# Patient Record
Sex: FEMALE | Race: WHITE | NOT HISPANIC OR LATINO | Employment: OTHER | URBAN - METROPOLITAN AREA
[De-identification: names, ages, dates, MRNs, and addresses within clinical notes are randomized per-mention and may not be internally consistent; named-entity substitution may affect disease eponyms.]

---

## 2020-07-08 ENCOUNTER — APPOINTMENT (EMERGENCY)
Dept: CT IMAGING | Facility: HOSPITAL | Age: 70
End: 2020-07-08
Payer: MEDICARE

## 2020-07-08 ENCOUNTER — HOSPITAL ENCOUNTER (EMERGENCY)
Facility: HOSPITAL | Age: 70
Discharge: HOME | End: 2020-07-08
Attending: EMERGENCY MEDICINE
Payer: MEDICARE

## 2020-07-08 VITALS
HEART RATE: 85 BPM | BODY MASS INDEX: 33.38 KG/M2 | TEMPERATURE: 97.9 F | OXYGEN SATURATION: 97 % | HEIGHT: 60 IN | WEIGHT: 170 LBS | SYSTOLIC BLOOD PRESSURE: 137 MMHG | DIASTOLIC BLOOD PRESSURE: 64 MMHG | RESPIRATION RATE: 16 BRPM
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
## 2020-07-08 PROCEDURE — 99284 EMERGENCY DEPT VISIT MOD MDM: CPT

## 2020-07-08 PROCEDURE — 99282 EMERGENCY DEPT VISIT SF MDM: CPT | Performed by: PHYSICIAN ASSISTANT

## 2020-07-08 PROCEDURE — 70450 CT HEAD/BRAIN W/O DYE: CPT

## 2020-07-08 RX ORDER — GINSENG 100 MG
1 CAPSULE ORAL ONCE
Status: COMPLETED | OUTPATIENT
Start: 2020-07-08 | End: 2020-07-08

## 2020-07-08 RX ADMIN — BACITRACIN 1 SMALL APPLICATION: 500 OINTMENT TOPICAL at 12:38

## 2020-07-08 NOTE — DISCHARGE INSTRUCTIONS
Follow-up with family doctor as needed  Return to the emergency department if her condition significantly worsens  Keep the abrasion on her nose clean and dry  Wash it with warm soap and water daily  Apply Neosporin for the next day

## 2020-07-09 NOTE — ED PROVIDER NOTES
History  Chief Complaint   Patient presents with    Nose Problem     fell from standing position, tripped, struck nose face while wearing glasses, laceration to bridge of nose  (-) LOC, ACT, PMH       82-year-old female presents after a fall complaining of a nose abrasion  She states that she was walking on the sidewalk and tripped over something falling and hitting her head  She states that she when faced down and her glasses dug into the bridge of her nose  She denies loss of consciousness  She was not dizzy, lightheaded, nauseous or having any chest pains or palpitations prior to the fall  She describes a mechanical fall in nature  She denies headache or visual changes at this time  The patient denies any fevers, chills, headache, dizziness, sore throat, shortness of breath, chest pain, abdominal pain, dysuria, polyuria, hematuria, melena, hematochezia, lower leg pain or swelling  None       History reviewed  No pertinent past medical history  Past Surgical History:   Procedure Laterality Date    HYSTERECTOMY         History reviewed  No pertinent family history  I have reviewed and agree with the history as documented  E-Cigarette/Vaping     E-Cigarette/Vaping Substances     Social History     Tobacco Use    Smoking status: Never Smoker   Substance Use Topics    Alcohol use: Never     Frequency: Never    Drug use: Never       Review of Systems   Constitutional: Negative for chills, fatigue and fever  HENT: Negative for ear pain and sore throat  Eyes: Negative for pain  Respiratory: Negative for cough, shortness of breath and wheezing  Cardiovascular: Negative for chest pain, palpitations and leg swelling  Gastrointestinal: Negative for abdominal pain, constipation, diarrhea, nausea and vomiting  Endocrine: Negative for polyuria  Genitourinary: Negative for dysuria and pelvic pain  Musculoskeletal: Negative for arthralgias, myalgias, neck pain and neck stiffness  Skin: Positive for wound  Negative for rash  Neurological: Negative for dizziness, syncope, light-headedness and headaches  All other systems reviewed and are negative  Physical Exam  Physical Exam   Constitutional: She is oriented to person, place, and time  She appears well-developed and well-nourished  HENT:   Head: Normocephalic and atraumatic  Mouth/Throat: No oropharyngeal exudate  Eyes: EOM are normal    Neck: Normal range of motion  Cardiovascular: Normal rate, regular rhythm and normal heart sounds  Pulmonary/Chest: Effort normal and breath sounds normal    Abdominal: Soft  Bowel sounds are normal  There is no tenderness  Musculoskeletal: Normal range of motion  Neurological: She is alert and oriented to person, place, and time  No dysarthria, nystagmus, dysphagia, diplopia, aphasia, vertigo, ataxia, visions loss, dysmetria  Normal finger to nose, strength and sensation in bilat upper and lower extremities  No pronator drift  Normal heel to shin  EOMI, no visual field defects  CN 2-12 intact  GCS 15  AOx3  Skin: Skin is warm  Capillary refill takes less than 2 seconds  Psychiatric: She has a normal mood and affect  Vital Signs  ED Triage Vitals [07/08/20 1015]   Temperature Pulse Respirations Blood Pressure SpO2   97 9 °F (36 6 °C) 85 16 137/64 97 %      Temp Source Heart Rate Source Patient Position - Orthostatic VS BP Location FiO2 (%)   Temporal Monitor Lying Left arm --      Pain Score       1           Vitals:    07/08/20 1015   BP: 137/64   Pulse: 85   Patient Position - Orthostatic VS: Lying         Visual Acuity      ED Medications  Medications   bacitracin topical ointment 1 small application (1 small application Topical Given 7/8/20 1238)       Diagnostic Studies  Results Reviewed     None                 CT head without contrast   Final Result by Kp Souza MD (07/08 1241)      No acute intracranial abnormality                    Workstation performed: [id number]                    Procedures  Procedures         ED Course                                             MDM  Number of Diagnoses or Management Options  Fall, initial encounter:   Diagnosis management comments: CT head negative for any acute abnormality  There is no laceration to repair  Only abrasion to bridge of nose which was covered bacitracin  Patient re-evaluated and deemed safe for discharge  Disposition  Final diagnoses:   Fall, initial encounter     Time reflects when diagnosis was documented in both MDM as applicable and the Disposition within this note     Time User Action Codes Description Comment    7/8/2020 12:44 PM Daniel Wells Add [C70  Ghulam Locke, initial encounter       ED Disposition     ED Disposition Condition Date/Time Comment    Discharge Stable Wed Jul 8, 2020 12:44 PM Carole Adam discharge to home/self care  Follow-up Information     Follow up With Specialties Details Why Maite Monaco MD Nephrology  If symptoms worsen [facility] [address] [id number] [phone]            There are no discharge medications for this patient  No discharge procedures on file      PDMP Review     None          ED Provider  Electronically Signed by           Adriana Neri PA-C  07/08/20 2312

## 2022-06-22 ENCOUNTER — TELEPHONE (OUTPATIENT)
Dept: NEPHROLOGY | Facility: CLINIC | Age: 72
End: 2022-06-22

## 2022-06-22 NOTE — TELEPHONE ENCOUNTER
[facility] breast [facility] they are aware patient is no longer under our care  Tried calling patient VM not set up  Will try back

## 2022-06-22 NOTE — TELEPHONE ENCOUNTER
She is not my patient  I have not seen her in years   She must have a new PCP  Please call the patient and tell her this and that she needs to get her PCP to order this

## 2022-06-22 NOTE — TELEPHONE ENCOUNTER
Jan from [address] [place] [address] called stating patients mammogram showed a mass in the left breast further diagnostic study needed  Diagnostic code R92 8  Jan would like you to put an order in for an ultrasound for patient  Jan stated the ultrasound script  must state the following  Left breast diagnostic ultrasound mammogram in indicated    We must fax the script to [phone]

## 2022-07-26 ENCOUNTER — TELEPHONE (OUTPATIENT)
Dept: ADMINISTRATIVE | Facility: OTHER | Age: 72
End: 2022-07-26

## 2022-07-26 NOTE — TELEPHONE ENCOUNTER
----- Message from Jaclyn Christensen sent at 7/25/2022  3:37 PM EDT -----  Regarding: Mammogram  07/25/22 3:37 PM    Hello, our patient Kira Woods has had Mammogram completed/performed  Please assist in updating the patient chart by pulling the Care Everywhere (CE) document  The date of service is 06/17/22       Thank you,  Jaclyn [facility]

## 2022-07-27 NOTE — TELEPHONE ENCOUNTER
Upon review of the In Basket request we were able to locate, review, and update the patient chart as requested for Mammogram     Any additional questions or concerns should be emailed to the Practice Liaisons via [email or web address] email, please do not reply via In Basket      Thank you  Suki Guzman

## 2022-07-29 ENCOUNTER — OFFICE VISIT (OUTPATIENT)
Dept: FAMILY MEDICINE CLINIC | Facility: CLINIC | Age: 72
End: 2022-07-29
Payer: MEDICARE

## 2022-07-29 VITALS
SYSTOLIC BLOOD PRESSURE: 128 MMHG | HEART RATE: 72 BPM | WEIGHT: 172 LBS | DIASTOLIC BLOOD PRESSURE: 76 MMHG | HEIGHT: 58 IN | OXYGEN SATURATION: 97 % | TEMPERATURE: 97.9 F | BODY MASS INDEX: 36.11 KG/M2

## 2022-07-29 DIAGNOSIS — D07.2 VAIN III (VAGINAL INTRAEPITHELIAL NEOPLASIA III): ICD-10-CM

## 2022-07-29 DIAGNOSIS — M81.0 OSTEOPOROSIS, UNSPECIFIED OSTEOPOROSIS TYPE, UNSPECIFIED PATHOLOGICAL FRACTURE PRESENCE: ICD-10-CM

## 2022-07-29 DIAGNOSIS — Z13.0 SCREENING FOR BLOOD DISEASE: ICD-10-CM

## 2022-07-29 DIAGNOSIS — Z01.419 WOMEN'S ANNUAL ROUTINE GYNECOLOGICAL EXAMINATION: ICD-10-CM

## 2022-07-29 DIAGNOSIS — D07.2 VAIN III (VAGINAL INTRAEPITHELIAL NEOPLASIA GRADE III): ICD-10-CM

## 2022-07-29 DIAGNOSIS — Z12.11 COLON CANCER SCREENING: ICD-10-CM

## 2022-07-29 DIAGNOSIS — N60.02 CYST OF LEFT BREAST: Primary | ICD-10-CM

## 2022-07-29 DIAGNOSIS — Z13.1 SCREENING FOR DIABETES MELLITUS: ICD-10-CM

## 2022-07-29 DIAGNOSIS — Z13.220 SCREENING FOR HYPERLIPIDEMIA: ICD-10-CM

## 2022-07-29 PROBLEM — D07.1 VIN III (VULVAR INTRAEPITHELIAL NEOPLASIA III): Status: ACTIVE | Noted: 2017-05-02

## 2022-07-29 PROCEDURE — 99204 OFFICE O/P NEW MOD 45 MIN: CPT | Performed by: FAMILY MEDICINE

## 2022-07-29 NOTE — ASSESSMENT & PLAN NOTE
Mammogram June 2022 showed left-sided breast cyst   Diagnostic ultrasound showed located 3 cm from the nipple, there is 0 8 x 0 4 x 0 5 cm predominantly cystic area    most consistent with a cluster of cysts versus patch of fibrocystic change  Most likely benign changes  Repeat ultrasound ordered for 6 months

## 2022-07-29 NOTE — ASSESSMENT & PLAN NOTE
Per last OBGYN note patient originally underwent TLH BSO in 2009  S/p laser ablation of the vagina for pap smear suggestive of HGSIL in 12/8/2017 by Dr Rachael Moncada       Pap smear history:  6/18/18 negative  12/14/18 ASCUS HPV16+  Colposcopy was unremarkable  06/17/19 ASCUS, HPV 16+  Colposcopy on 7/8/19 was unremarkable  1/10/20 inadequate tissue, HPV 16+  7/10/20 ASCH, HPV16+  Colposcopy unremarkable  1/11/21 ASCUS, HPV16+  Colposcopy unremarkable  7/12/21 ASCH, HPV16+  Colposcopy unremarkable

## 2022-07-29 NOTE — PROGRESS NOTES
Assessment/Plan:    Cyst of left breast  Mammogram June 2022 showed left-sided breast cyst   Diagnostic ultrasound showed located 3 cm from the nipple, there is 0 8 x 0 4 x 0 5 cm predominantly cystic area    most consistent with a cluster of cysts versus patch of fibrocystic change  Most likely benign changes  Repeat ultrasound ordered for 6 months  VAIN III (vaginal intraepithelial neoplasia III)  Per last OBGYN note patient originally underwent TLH BSO in 2009  S/p laser ablation of the vagina for pap smear suggestive of HGSIL in 12/8/2017 by Dr Anh Velarde       Pap smear history:  6/18/18 negative  12/14/18 ASCUS HPV16+  Colposcopy was unremarkable  06/17/19 ASCUS, HPV 16+  Colposcopy on 7/8/19 was unremarkable  1/10/20 inadequate tissue, HPV 16+  7/10/20 ASCH, HPV16+  Colposcopy unremarkable  1/11/21 ASCUS, HPV16+  Colposcopy unremarkable  7/12/21 ASCH, HPV16+  Colposcopy unremarkable  Out of network notes, mammograms, ultrasound and labs reviewed  BMI Counseling: Body mass index is 35 95 kg/m²  Follow-up plan was not completed due to elderly patient (74 years old) where weight reduction/weight gain would complicate underlying health condition such as: nutritional deficiency  Depression Screening and Follow-up Plan: Patient was screened for depression during today's encounter  They screened negative with a PHQ-2 score of 0  Problem List Items Addressed This Visit        Genitourinary    VAIN III (vaginal intraepithelial neoplasia III)     Per last OBGYN note patient originally underwent TLH BSO in 2009  S/p laser ablation of the vagina for pap smear suggestive of HGSIL in 12/8/2017 by Dr Anh Velarde       Pap smear history:  6/18/18 negative  12/14/18 ASCUS HPV16+  Colposcopy was unremarkable  06/17/19 ASCUS, HPV 16+  Colposcopy on 7/8/19 was unremarkable  1/10/20 inadequate tissue, HPV 16+  7/10/20 ASCH, HPV16+  Colposcopy unremarkable  1/11/21 ASCUS, HPV16+  Colposcopy unremarkable  7/12/21 ASCH, HPV16+  Colposcopy unremarkable  Other    Cyst of left breast - Primary     Mammogram June 2022 showed left-sided breast cyst   Diagnostic ultrasound showed located 3 cm from the nipple, there is 0 8 x 0 4 x 0 5 cm predominantly cystic area    most consistent with a cluster of cysts versus patch of fibrocystic change  Most likely benign changes  Repeat ultrasound ordered for 6 months  Relevant Orders    US breast left limited (diagnostic)      Other Visit Diagnoses     VAIN III (vaginal intraepithelial neoplasia grade III)        Relevant Orders    Ambulatory Referral to Obstetrics / Gynecology    Colon cancer screening        Relevant Orders    Cologuard    Screening for hyperlipidemia        Relevant Orders    Lipid panel    Screening for diabetes mellitus        Relevant Orders    Comprehensive metabolic panel    Screening for blood disease        Relevant Orders    CBC and differential    Osteoporosis, unspecified osteoporosis type, unspecified pathological fracture presence        Relevant Orders    DXA bone density spine hip and pelvis    Women's annual routine gynecological examination        Relevant Orders    Ambulatory Referral to Obstetrics / Gynecology            Subjective:      Patient ID: Kira Woods is a 73 y o  female  Presents to the office to establish care  Has not had PCP in over 4 years  Seeing gynecologic oncology at [address] [place] [address] for [facility]  She has had hysterectomy in the past   Says is stable and has maintained proper follow-up  She did have mammogram in June which showed left-sided breast cyst   She went for diagnostic ultrasound which showed likely benign cyst however follow-up ultrasound was recommended  Denies any other significant medical history  No other surgical history  Not currently on any medications  Denies any acute complaints or concerns        The following portions of the patient's history were reviewed and updated as appropriate: allergies, current medications, past family history, past medical history, past social history, past surgical history and problem list     Review of Systems      Objective:      /76 (BP Location: Left arm, Patient Position: Sitting)   Pulse 72   Temp 97 9 °F (36 6 °C)   Ht 4' 10" (1 473 m)   Wt 78 kg (172 lb)   SpO2 97%   BMI 35 95 kg/m²          Physical Exam  Vitals and nursing note reviewed  Constitutional:       General: She is not in acute distress  Appearance: Normal appearance  She is not ill-appearing, toxic-appearing or diaphoretic  Eyes:      General:         Right eye: No discharge  Left eye: No discharge  Extraocular Movements: Extraocular movements intact  Conjunctiva/sclera: Conjunctivae normal    Cardiovascular:      Rate and Rhythm: Normal rate  Pulmonary:      Effort: Pulmonary effort is normal    Musculoskeletal:      Cervical back: Normal range of motion and neck supple  Neurological:      Mental Status: She is alert and oriented to person, place, and time  Psychiatric:         Mood and Affect: Mood normal          Behavior: Behavior normal          Thought Content:  Thought content normal          Judgment: Judgment normal

## 2022-08-09 ENCOUNTER — APPOINTMENT (OUTPATIENT)
Dept: LAB | Facility: CLINIC | Age: 72
End: 2022-08-09
Payer: MEDICARE

## 2022-08-09 DIAGNOSIS — Z13.1 SCREENING FOR DIABETES MELLITUS: ICD-10-CM

## 2022-08-09 DIAGNOSIS — Z13.220 SCREENING FOR HYPERLIPIDEMIA: ICD-10-CM

## 2022-08-09 DIAGNOSIS — Z13.0 SCREENING FOR BLOOD DISEASE: ICD-10-CM

## 2022-08-09 LAB
ALBUMIN SERPL BCP-MCNC: 3.6 G/DL (ref 3.5–5)
ALP SERPL-CCNC: 91 U/L (ref 46–116)
ALT SERPL W P-5'-P-CCNC: 31 U/L (ref 12–78)
ANION GAP SERPL CALCULATED.3IONS-SCNC: 5 MMOL/L (ref 4–13)
AST SERPL W P-5'-P-CCNC: 28 U/L (ref 5–45)
BASOPHILS # BLD AUTO: 0.02 THOUSANDS/ΜL (ref 0–0.1)
BASOPHILS NFR BLD AUTO: 0 % (ref 0–1)
BILIRUB SERPL-MCNC: 1.03 MG/DL (ref 0.2–1)
BUN SERPL-MCNC: 13 MG/DL (ref 5–25)
CALCIUM SERPL-MCNC: 9.8 MG/DL (ref 8.3–10.1)
CHLORIDE SERPL-SCNC: 110 MMOL/L (ref 96–108)
CHOLEST SERPL-MCNC: 177 MG/DL
CO2 SERPL-SCNC: 26 MMOL/L (ref 21–32)
CREAT SERPL-MCNC: 0.84 MG/DL (ref 0.6–1.3)
EOSINOPHIL # BLD AUTO: 0.14 THOUSAND/ΜL (ref 0–0.61)
EOSINOPHIL NFR BLD AUTO: 3 % (ref 0–6)
ERYTHROCYTE [DISTWIDTH] IN BLOOD BY AUTOMATED COUNT: 13.1 % (ref 11.6–15.1)
GFR SERPL CREATININE-BSD FRML MDRD: 71 ML/MIN/1.73SQ M
GLUCOSE P FAST SERPL-MCNC: 97 MG/DL (ref 65–99)
HCT VFR BLD AUTO: 48.1 % (ref 34.8–46.1)
HDLC SERPL-MCNC: 51 MG/DL
HGB BLD-MCNC: 15.1 G/DL (ref 11.5–15.4)
IMM GRANULOCYTES # BLD AUTO: 0.02 THOUSAND/UL (ref 0–0.2)
IMM GRANULOCYTES NFR BLD AUTO: 0 % (ref 0–2)
LDLC SERPL CALC-MCNC: 105 MG/DL (ref 0–100)
LYMPHOCYTES # BLD AUTO: 0.94 THOUSANDS/ΜL (ref 0.6–4.47)
LYMPHOCYTES NFR BLD AUTO: 17 % (ref 14–44)
MCH RBC QN AUTO: 29.2 PG (ref 26.8–34.3)
MCHC RBC AUTO-ENTMCNC: 31.4 G/DL (ref 31.4–37.4)
MCV RBC AUTO: 93 FL (ref 82–98)
MONOCYTES # BLD AUTO: 0.4 THOUSAND/ΜL (ref 0.17–1.22)
MONOCYTES NFR BLD AUTO: 7 % (ref 4–12)
NEUTROPHILS # BLD AUTO: 4.12 THOUSANDS/ΜL (ref 1.85–7.62)
NEUTS SEG NFR BLD AUTO: 73 % (ref 43–75)
NONHDLC SERPL-MCNC: 126 MG/DL
NRBC BLD AUTO-RTO: 0 /100 WBCS
PLATELET # BLD AUTO: 230 THOUSANDS/UL (ref 149–390)
PMV BLD AUTO: 9.8 FL (ref 8.9–12.7)
POTASSIUM SERPL-SCNC: 4.4 MMOL/L (ref 3.5–5.3)
PROT SERPL-MCNC: 7.3 G/DL (ref 6.4–8.4)
RBC # BLD AUTO: 5.17 MILLION/UL (ref 3.81–5.12)
SODIUM SERPL-SCNC: 141 MMOL/L (ref 135–147)
TRIGL SERPL-MCNC: 105 MG/DL
WBC # BLD AUTO: 5.64 THOUSAND/UL (ref 4.31–10.16)

## 2022-08-09 PROCEDURE — 36415 COLL VENOUS BLD VENIPUNCTURE: CPT

## 2022-08-09 PROCEDURE — 85025 COMPLETE CBC W/AUTO DIFF WBC: CPT

## 2022-08-09 PROCEDURE — 80053 COMPREHEN METABOLIC PANEL: CPT

## 2022-08-09 PROCEDURE — 80061 LIPID PANEL: CPT

## 2022-08-12 LAB — COLOGUARD RESULT REPORTABLE: NEGATIVE

## 2022-08-18 ENCOUNTER — HOSPITAL ENCOUNTER (OUTPATIENT)
Dept: RADIOLOGY | Facility: IMAGING CENTER | Age: 72
Discharge: HOME | End: 2022-08-18
Payer: MEDICARE

## 2022-08-18 DIAGNOSIS — M81.0 OSTEOPOROSIS, UNSPECIFIED OSTEOPOROSIS TYPE, UNSPECIFIED PATHOLOGICAL FRACTURE PRESENCE: ICD-10-CM

## 2022-08-18 PROCEDURE — 77080 DXA BONE DENSITY AXIAL: CPT

## 2022-08-29 ENCOUNTER — OFFICE VISIT (OUTPATIENT)
Dept: FAMILY MEDICINE CLINIC | Facility: CLINIC | Age: 72
End: 2022-08-29
Payer: MEDICARE

## 2022-08-29 VITALS
DIASTOLIC BLOOD PRESSURE: 82 MMHG | HEIGHT: 58 IN | WEIGHT: 169 LBS | OXYGEN SATURATION: 98 % | TEMPERATURE: 97.4 F | BODY MASS INDEX: 35.48 KG/M2 | HEART RATE: 68 BPM | SYSTOLIC BLOOD PRESSURE: 126 MMHG

## 2022-08-29 DIAGNOSIS — R71.8 ABNORMAL HEMATOCRIT: ICD-10-CM

## 2022-08-29 DIAGNOSIS — Z00.00 MEDICARE ANNUAL WELLNESS VISIT, SUBSEQUENT: Primary | ICD-10-CM

## 2022-08-29 PROCEDURE — G0439 PPPS, SUBSEQ VISIT: HCPCS | Performed by: FAMILY MEDICINE

## 2022-08-29 NOTE — PATIENT INSTRUCTIONS
Medicare Preventive Visit Patient Instructions  Thank you for completing your Welcome to Medicare Visit or Medicare Annual Wellness Visit today  Your next wellness visit will be due in one year (8/30/2023)  The screening/preventive services that you may require over the next 5-10 years are detailed below  Some tests may not apply to you based off risk factors and/or age  Screening tests ordered at today's visit but not completed yet may show as past due  Also, please note that scanned in results may not display below  Preventive Screenings:  Service Recommendations Previous Testing/Comments   Colorectal Cancer Screening  * Colonoscopy    * Fecal Occult Blood Test (FOBT)/Fecal Immunochemical Test (FIT)  * Fecal DNA/Cologuard Test  * Flexible Sigmoidoscopy Age: 39-70 years old   Colonoscopy: every 10 years (may be performed more frequently if at higher risk)  OR  FOBT/FIT: every 1 year  OR  Cologuard: every 3 years  OR  Sigmoidoscopy: every 5 years  Screening may be recommended earlier than age 39 if at higher risk for colorectal cancer  Also, an individualized decision between you and your healthcare provider will decide whether screening between the ages of 74-80 would be appropriate  Colonoscopy: 08/08/2022  FOBT/FIT: Not on file  Cologuard: 08/08/2022  Sigmoidoscopy: Not on file    Screening Current     Breast Cancer Screening Age: 36 years old  Frequency: every 1-2 years  Not required if history of left and right mastectomy Mammogram: 06/17/2022    Screening Current   Cervical Cancer Screening Between the ages of 21-29, pap smear recommended once every 3 years  Between the ages of 33-67, can perform pap smear with HPV co-testing every 5 years     Recommendations may differ for women with a history of total hysterectomy, cervical cancer, or abnormal pap smears in past  Pap Smear: Not on file    Screening Not Indicated   Hepatitis C Screening Once for adults born between 1945 and 1965  More frequently in patients at high risk for Hepatitis C Hep C Antibody: Not on file        Diabetes Screening 1-2 times per year if you're at risk for diabetes or have pre-diabetes Fasting glucose: 97 mg/dL (8/9/2022)  A1C: No results in last 5 years (No results in last 5 years)  Screening Current   Cholesterol Screening Once every 5 years if you don't have a lipid disorder  May order more often based on risk factors  Lipid panel: 08/09/2022    Screening Current     Other Preventive Screenings Covered by Medicare:  1  Abdominal Aortic Aneurysm (AAA) Screening: covered once if your at risk  You're considered to be at risk if you have a family history of AAA  2  Lung Cancer Screening: covers low dose CT scan once per year if you meet all of the following conditions: (1) Age 50-69; (2) No signs or symptoms of lung cancer; (3) Current smoker or have quit smoking within the last 15 years; (4) You have a tobacco smoking history of at least 20 pack years (packs per day multiplied by number of years you smoked); (5) You get a written order from a healthcare provider  3  Glaucoma Screening: covered annually if you're considered high risk: (1) You have diabetes OR (2) Family history of glaucoma OR (3)  aged 48 and older OR (3)  American aged 72 and older  3  Osteoporosis Screening: covered every 2 years if you meet one of the following conditions: (1) You're estrogen deficient and at risk for osteoporosis based off medical history and other findings; (2) Have a vertebral abnormality; (3) On glucocorticoid therapy for more than 3 months; (4) Have primary hyperparathyroidism; (5) On osteoporosis medications and need to assess response to drug therapy  · Last bone density test (DXA Scan): 08/18/2022   5  HIV Screening: covered annually if you're between the age of 15-65  Also covered annually if you are younger than 13 and older than 72 with risk factors for HIV infection   For pregnant patients, it is covered up to 3 times per pregnancy  Immunizations:  Immunization Recommendations   Influenza Vaccine Annual influenza vaccination during flu season is recommended for all persons aged >= 6 months who do not have contraindications   Pneumococcal Vaccine   * Pneumococcal conjugate vaccine = PCV13 (Prevnar 13), PCV15 (Vaxneuvance), PCV20 (Prevnar 20)  * Pneumococcal polysaccharide vaccine = PPSV23 (Pneumovax) Adults 25-60 years old: 1-3 doses may be recommended based on certain risk factors  Adults 72 years old: 1-2 doses may be recommended based off what pneumonia vaccine you previously received   Hepatitis B Vaccine 3 dose series if at intermediate or high risk (ex: diabetes, end stage renal disease, liver disease)   Tetanus (Td) Vaccine - COST NOT COVERED BY MEDICARE PART B Following completion of primary series, a booster dose should be given every 10 years to maintain immunity against tetanus  Td may also be given as tetanus wound prophylaxis  Tdap Vaccine - COST NOT COVERED BY MEDICARE PART B Recommended at least once for all adults  For pregnant patients, recommended with each pregnancy  Shingles Vaccine (Shingrix) - COST NOT COVERED BY MEDICARE PART B  2 shot series recommended in those aged 48 and above     Health Maintenance Due:      Topic Date Due    Hepatitis C Screening  Never done    Breast Cancer Screening: Mammogram  06/17/2023    Colorectal Cancer Screening  08/08/2025     Immunizations Due:      Topic Date Due    COVID-19 Vaccine (1) Never done    Pneumococcal Vaccine: 65+ Years (1 - PCV) Never done    Influenza Vaccine (1) 09/01/2022     Advance Directives   What are advance directives? Advance directives are legal documents that state your wishes and plans for medical care  These plans are made ahead of time in case you lose your ability to make decisions for yourself  Advance directives can apply to any medical decision, such as the treatments you want, and if you want to donate organs     What are the types of advance directives? There are many types of advance directives, and each state has rules about how to use them  You may choose a combination of any of the following:  · Living will: This is a written record of the treatment you want  You can also choose which treatments you do not want, which to limit, and which to stop at a certain time  This includes surgery, medicine, IV fluid, and tube feedings  · Durable power of  for healthcare [facility]): This is a written record that states who you want to make healthcare choices for you when you are unable to make them for yourself  This person, called a proxy, is usually a family member or a friend  You may choose more than 1 proxy  · Do not resuscitate (DNR) order:  A DNR order is used in case your heart stops beating or you stop breathing  It is a request not to have certain forms of treatment, such as CPR  A DNR order may be included in other types of advance directives  · Medical directive: This covers the care that you want if you are in a coma, near death, or unable to make decisions for yourself  You can list the treatments you want for each condition  Treatment may include pain medicine, surgery, blood transfusions, dialysis, IV or tube feedings, and a ventilator (breathing machine)  · Values history: This document has questions about your views, beliefs, and how you feel and think about life  This information can help others choose the care that you would choose  Why are advance directives important? An advance directive helps you control your care  Although spoken wishes may be used, it is better to have your wishes written down  Spoken wishes can be misunderstood, or not followed  Treatments may be given even if you do not want them  An advance directive may make it easier for your family to make difficult choices about your care     Weight Management   Why it is important to manage your weight:  Being overweight increases your risk of health conditions such as heart disease, high blood pressure, type 2 diabetes, and certain types of cancer  It can also increase your risk for osteoarthritis, sleep apnea, and other respiratory problems  Aim for a slow, steady weight loss  Even a small amount of weight loss can lower your risk of health problems  How to lose weight safely:  A safe and healthy way to lose weight is to eat fewer calories and get regular exercise  You can lose up about 1 pound a week by decreasing the number of calories you eat by 500 calories each day  Healthy meal plan for weight management:  A healthy meal plan includes a variety of foods, contains fewer calories, and helps you stay healthy  A healthy meal plan includes the following:  · Eat whole-grain foods more often  A healthy meal plan should contain fiber  Fiber is the part of grains, fruits, and vegetables that is not broken down by your body  Whole-grain foods are healthy and provide extra fiber in your diet  Some examples of whole-grain foods are whole-wheat breads and pastas, oatmeal, brown rice, and bulgur  · Eat a variety of vegetables every day  Include dark, leafy greens such as spinach, kale, nicole greens, and mustard greens  Eat yellow and orange vegetables such as carrots, sweet potatoes, and winter squash  · Eat a variety of fruits every day  Choose fresh or canned fruit (canned in its own juice or light syrup) instead of juice  Fruit juice has very little or no fiber  · Eat low-fat dairy foods  Drink fat-free (skim) milk or 1% milk  Eat fat-free yogurt and low-fat cottage cheese  Try low-fat cheeses such as mozzarella and other reduced-fat cheeses  · Choose meat and other protein foods that are low in fat  Choose beans or other legumes such as split peas or lentils  Choose fish, skinless poultry (chicken or turkey), or lean cuts of red meat (beef or pork)  Before you cook meat or poultry, cut off any visible fat  · Use less fat and oil    Try baking foods instead of frying them  Add less fat, such as margarine, sour cream, regular salad dressing and mayonnaise to foods  Eat fewer high-fat foods  Some examples of high-fat foods include french fries, doughnuts, ice cream, and cakes  · Eat fewer sweets  Limit foods and drinks that are high in sugar  This includes candy, cookies, regular soda, and sweetened drinks  Exercise:  Exercise at least 30 minutes per day on most days of the week  Some examples of exercise include walking, biking, dancing, and swimming  You can also fit in more physical activity by taking the stairs instead of the elevator or parking farther away from stores  Ask your healthcare provider about the best exercise plan for you  © Copyright [employer] 2018 Information is for End User's use only and may not be sold, redistributed or otherwise used for commercial purposes   All illustrations and images included in CareNotes® are the copyrighted property of A D A M , Inc  or [address] [place] [address]

## 2022-08-29 NOTE — PROGRESS NOTES
Assessment and Plan:     Problem List Items Addressed This Visit    None     Visit Diagnoses     Medicare annual wellness visit, subsequent    -  Primary    Abnormal hematocrit        Relevant Orders    Ambulatory Referral to Hematology / Oncology          Depression Screening and Follow-up Plan: Patient was screened for depression during today's encounter  They screened negative with a PHQ-2 score of 0  We reviewed preventative labs and screenings that were ordered for today's visit  Her Cologuard was negative  DEXA scan did not show any signs of osteoporosis  Labs were all stable except CBC did show elevated hematocrit  Likely reactive however she does have a history of DVT in the past   Options were discussed  Will place referral to Hematology today  Preventive health issues were discussed with patient, and age appropriate screening tests were ordered as noted in patient's After Visit Summary  Personalized health advice and appropriate referrals for health education or preventive services given if needed, as noted in patient's After Visit Summary  History of Present Illness:     Patient presents for a Medicare Wellness Visit    HPI   Patient Care Team:  Sudarshan Martinez MD as PCP - General (Family Medicine)     Review of Systems:     Review of Systems     Problem List:     Patient Active Problem List   Diagnosis    VAIN III (vaginal intraepithelial neoplasia III)    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)    Cyst of left breast      Past Medical and Surgical History:     History reviewed  No pertinent past medical history    Past Surgical History:   Procedure Laterality Date    APPENDECTOMY      HYSTERECTOMY        Family History:     Family History   Problem Relation Age of Onset    No Known Problems Mother     No Known Problems Father       Social History:     Social History     Socioeconomic History    Marital status: Single     Spouse name: None    Number of children: None    Years of education: None    Highest education level: None   Occupational History    None   Tobacco Use    Smoking status: Never Smoker    Smokeless tobacco: Never Used   Vaping Use    Vaping Use: Never used   Substance and Sexual Activity    Alcohol use: Never    Drug use: Never    Sexual activity: Not Currently     Partners: Male   Other Topics Concern    None   Social History Narrative    None     Social Determinants of Health     Financial Resource Strain: Low Risk     Difficulty of Paying Living Expenses: Not hard at all   Food Insecurity: Not on file   Transportation Needs: No Transportation Needs    Lack of Transportation (Medical): No    Lack of Transportation (Non-Medical): No   Physical Activity: Not on file   Stress: Not on file   Social Connections: Not on file   Intimate Partner Violence: Not on file   Housing Stability: Not on file      Medications and Allergies:     No current outpatient medications on file  No current facility-administered medications for this visit  Allergies   Allergen Reactions    Sulfamethoxazole-Trimethoprim     Penicillins Rash     "fast heart beat"      Immunizations:     Immunization History   Administered Date(s) Administered    Tdap 05/08/2010      Health Maintenance:         Topic Date Due    Hepatitis C Screening  Never done    Breast Cancer Screening: Mammogram  06/17/2023    Colorectal Cancer Screening  08/08/2025         Topic Date Due    COVID-19 Vaccine (1) Never done    Pneumococcal Vaccine: 65+ Years (1 - PCV) Never done    Influenza Vaccine (1) 09/01/2022      Medicare Screening Tests and Risk Assessments:     Barrett Hinojosa is here for her Subsequent Wellness visit  Last Medicare Wellness visit information reviewed, patient interviewed, no change since last AWV  Health Risk Assessment:   Patient rates overall health as very good  Patient feels that their physical health rating is same   Patient is very satisfied with their life  Eyesight was rated as same  Hearing was rated as same  Patient feels that their emotional and mental health rating is slightly better  Patients states they are sometimes angry  Patient states they are never, rarely unusually tired/fatigued  Pain experienced in the last 7 days has been some  Patient's pain rating has been 1/10  Patient states that she has experienced no weight loss or gain in last 6 months  Depression Screening:   PHQ-2 Score: 0      Fall Risk Screening: In the past year, patient has experienced: no history of falling in past year      Urinary Incontinence Screening:   Patient has not leaked urine accidently in the last six months  Home Safety:  Patient does not have trouble with stairs inside or outside of their home  Patient has working smoke alarms and has working carbon monoxide detector  Home safety hazards include: none  Nutrition:   Current diet is Regular  Medications:   Patient is not currently taking any over-the-counter supplements  Patient is able to manage medications  Activities of Daily Living (ADLs)/Instrumental Activities of Daily Living (IADLs):   Walk and transfer into and out of bed and chair?: Yes  Dress and groom yourself?: Yes    Bathe or shower yourself?: Yes    Feed yourself?  Yes  Do your laundry/housekeeping?: Yes  Manage your money, pay your bills and track your expenses?: Yes  Make your own meals?: Yes    Do your own shopping?: Yes    Previous Hospitalizations:   Any hospitalizations or ED visits within the last 12 months?: No      Cognitive Screening:   Provider or family/friend/caregiver concerned regarding cognition?: No    PREVENTIVE SCREENINGS      Cardiovascular Screening:    General: Screening Current      Diabetes Screening:     General: Screening Current      Colorectal Cancer Screening:     General: Screening Current      Breast Cancer Screening:     General: Screening Current      Cervical Cancer Screening:    General: Screening Not Indicated      Osteoporosis Screening:    General: Screening Not Indicated and History Osteoporosis      Abdominal Aortic Aneurysm (AAA) Screening:        General: Screening Not Indicated      Lung Cancer Screening:     General: Screening Not Indicated      Hepatitis C Screening:    General: Screening Not Indicated    Hep C Screening Accepted: No     Screening, Brief Intervention, and Referral to Treatment (SBIRT)    Screening  Typical number of drinks in a day: 0  Typical number of drinks in a week: 0  Interpretation: Low risk drinking behavior  AUDIT-C Screenin) How often did you have a drink containing alcohol in the past year? monthly or less  2) How many drinks did you have on a typical day when you were drinking in the past year? 1 to 2  3) How often did you have 6 or more drinks on one occasion in the past year? never    AUDIT-C Score: 1  Interpretation: Score 0-2 (female): Negative screen for alcohol misuse    Single Item Drug Screening:  How often have you used an illegal drug (including marijuana) or a prescription medication for non-medical reasons in the past year? never    Single Item Drug Screen Score: 0  Interpretation: Negative screen for possible drug use disorder    Brief Intervention  Alcohol & drug use screenings were reviewed  No concerns regarding substance use disorder identified  Other Counseling Topics:   Car/seat belt/driving safety, skin self-exam, sunscreen and calcium and vitamin D intake and regular weightbearing exercise       No exam data present     Physical Exam:     /82 (BP Location: Left arm, Patient Position: Sitting)   Pulse 68   Temp (!) 97 4 °F (36 3 °C)   Ht 4' 10" (1 473 m)   Wt 76 7 kg (169 lb)   SpO2 98%   BMI 35 32 kg/m²     Physical Exam     David Hunter MD

## 2022-10-11 ENCOUNTER — CONSULT (OUTPATIENT)
Dept: HEMATOLOGY ONCOLOGY | Facility: CLINIC | Age: 72
End: 2022-10-11
Payer: MEDICARE

## 2022-10-11 VITALS
WEIGHT: 164 LBS | HEART RATE: 78 BPM | DIASTOLIC BLOOD PRESSURE: 80 MMHG | OXYGEN SATURATION: 99 % | TEMPERATURE: 97.2 F | RESPIRATION RATE: 16 BRPM | BODY MASS INDEX: 34.43 KG/M2 | SYSTOLIC BLOOD PRESSURE: 124 MMHG | HEIGHT: 58 IN

## 2022-10-11 DIAGNOSIS — R71.8 ABNORMAL HEMATOCRIT: ICD-10-CM

## 2022-10-11 DIAGNOSIS — R71.8 ELEVATED HEMATOCRIT: Primary | ICD-10-CM

## 2022-10-11 DIAGNOSIS — D53.9 NUTRITIONAL ANEMIA: ICD-10-CM

## 2022-10-11 PROCEDURE — 99204 OFFICE O/P NEW MOD 45 MIN: CPT | Performed by: NURSE PRACTITIONER

## 2022-10-11 NOTE — PROGRESS NOTES
Hematology/Oncology Outpatient Consultation  Chasity Peralta 73 y o  female [id number]    Date:  10/11/2022      Assessment and Plan:  1  Elevated hematocrit  Patient seems to have mild erythrocytosis on and off at least since 2014  She reports feeling well, is very active and has no significant comorbidities  We did discuss possible etiologies of her erythrocytosis including primary myeloproliferative disorder versus more likely secondary etiology  She does admit to consuming a lot of coffee during the day with very limited water/fluid intake otherwise which could suggest secondary erythrocytosis due to volume depletion/dehydration  She was encouraged to try to hydrate her self better with at least 2lt of fluid (not including her coffee intake) daily  We will pursue additional workup to rule out other etiologies/myeloproliferative disorder  Request she follow up in about 4-8 weeks from now to review the findings of her initial workup which will be acted on accordingly  She was told that if her erythrocytosis persists without a good explanation could consider sleep study in the future to rule out obstructive sleep apnea  She states she has been told she snores in her sleep in the past     - CBC and differential; Future  - Comprehensive metabolic panel; Future  - C-reactive protein; Future  - Sedimentation rate, automated; Future  - Erythropoietin; Future  - Carboxyhemoglobin; Future  - JAK2 V617F,Ql,W/RFL Exons 12,13 and MPL G343,P498; Future  - Folate; Future  - LD,Blood; Future  - Vitamin B12; Future  - Iron Panel (Includes Ferritin, Iron Sat%, Iron, and TIBC); Future  - Ferritin; Future  - US abdomen complete; Future        HPI:  Patient is a 68-year-old female who presents for further evaluation of her elevated hematocrit/RBCs  She has no significant past medical history other than abnormal Pap smear status post hysterectomy; is being monitored by gynecology oncology      She has never smoked and denies any secondhand smoke exposure  Denies any constitutional symptoms  Denies early satiety, rash, headaches, dizziness or aquagenic pruritus  Denies any history of lung disease  She does admits that she was told in the past that she snores on occasion but denies fatigue/daytime sleepiness or any episodes of awakening gasping for air/choking  Patient states that she is very active and does not like to sit still  She still works 1 day a week part-time and otherwise is staying active running errands or doing things around the house  States that she does get some right leg discomfort at times when she is very active during the day  She does consume a significant amount of coffee on a daily basis and admits she does not drink a lot of water during the day 1-2, 8 oz bottles if that  Her most recent laboratory studies from 08/09/2022 showed normal WBC 5 64, RBC slightly above average 5 17, hemoglobin high normal 15 1, hematocrit mildly elevated 48 1%, MCV normal 93 platelet count normal 230  Total bili slightly above average 1 03 otherwise normal CMP  She does not have a lot of prior laboratory studies available for review, but did note mild elevation of her H&H/RBC on her CBC from September 2014  ROS: Review of Systems   Constitutional: Negative for activity change, appetite change, chills, fatigue, fever and unexpected weight change  HENT: Positive for dental problem  Negative for congestion, mouth sores, nosebleeds, sore throat and trouble swallowing  Eyes: Negative  Respiratory: Negative for cough, chest tightness and shortness of breath  Cardiovascular: Negative for chest pain, palpitations and leg swelling  Gastrointestinal: Negative for abdominal distention, abdominal pain, blood in stool, constipation, diarrhea, nausea and vomiting  Genitourinary: Negative for difficulty urinating, dysuria, frequency, hematuria and urgency     Musculoskeletal: Positive for arthralgias (RLE at times) and myalgias  Negative for back pain, gait problem and joint swelling  Skin: Negative for color change, pallor and rash  Neurological: Positive for numbness (occasional legs)  Negative for dizziness, weakness, light-headedness and headaches  Hematological: Negative for adenopathy  Does not bruise/bleed easily  Psychiatric/Behavioral: Positive for sleep disturbance  Negative for dysphoric mood  The patient is not nervous/anxious  No past medical history on file  Past Surgical History:   Procedure Laterality Date   • APPENDECTOMY     • HYSTERECTOMY         Social History     Socioeconomic History   • Marital status: Single     Spouse name: None   • Number of children: None   • Years of education: None   • Highest education level: None   Occupational History   • None   Tobacco Use   • Smoking status: Never Smoker   • Smokeless tobacco: Never Used   Vaping Use   • Vaping Use: Never used   Substance and Sexual Activity   • Alcohol use: Never   • Drug use: Never   • Sexual activity: Not Currently     Partners: Male   Other Topics Concern   • None   Social History Narrative   • None     Social Determinants of Health     Financial Resource Strain: Low Risk    • Difficulty of Paying Living Expenses: Not hard at all   Food Insecurity: Not on file   Transportation Needs: No Transportation Needs   • Lack of Transportation (Medical): No   • Lack of Transportation (Non-Medical): No   Physical Activity: Not on file   Stress: Not on file   Social Connections: Not on file   Intimate Partner Violence: Not on file   Housing Stability: Not on file       Family History   Problem Relation Age of Onset   • No Known Problems Mother    • No Known Problems Father        Allergies   Allergen Reactions   • Sulfamethoxazole-Trimethoprim    • Penicillins Rash     "fast heart beat"       No current outpatient medications on file        Physical Exam:  /80 (BP Location: Right arm, Patient Position: Sitting, Cuff Size: Adult)   Pulse 78   Temp (!) 97 2 °F (36 2 °C)   Resp 16   Ht 4' 10" (1 473 m)   Wt 74 4 kg (164 lb)   SpO2 99%   BMI 34 28 kg/m²     Physical Exam  Vitals reviewed  Constitutional:       General: She is not in acute distress  Appearance: She is well-developed  She is obese  She is not diaphoretic  HENT:      Head: Normocephalic and atraumatic  Eyes:      General: No scleral icterus  Conjunctiva/sclera: Conjunctivae normal       Pupils: Pupils are equal, round, and reactive to light  Neck:      Thyroid: No thyromegaly  Cardiovascular:      Rate and Rhythm: Normal rate and regular rhythm  Heart sounds: Normal heart sounds  No murmur heard  Pulmonary:      Effort: Pulmonary effort is normal  No respiratory distress  Breath sounds: Normal breath sounds  Chest:   Breasts:      Right: No axillary adenopathy  Left: No axillary adenopathy  Abdominal:      General: There is no distension  Palpations: Abdomen is soft  There is no hepatomegaly or splenomegaly  Tenderness: There is no abdominal tenderness  Musculoskeletal:         General: No swelling  Normal range of motion  Cervical back: Normal range of motion and neck supple  Lymphadenopathy:      Cervical: No cervical adenopathy  Upper Body:      Right upper body: No axillary adenopathy  Left upper body: No axillary adenopathy  Skin:     General: Skin is warm and dry  Findings: No erythema or rash  Neurological:      General: No focal deficit present  Mental Status: She is alert and oriented to person, place, and time  Psychiatric:         Mood and Affect: Mood and affect normal          Behavior: Behavior normal  Behavior is cooperative  Thought Content:  Thought content normal          Judgment: Judgment normal            Labs:  Lab Results   Component Value Date    WBC 5 64 08/09/2022    HGB 15 1 08/09/2022    HCT 48 1 (H) 08/09/2022    MCV 93 08/09/2022  08/09/2022     Lab Results   Component Value Date    K 4 4 08/09/2022     (H) 08/09/2022    CO2 26 08/09/2022    BUN 13 08/09/2022    CREATININE 0 84 08/09/2022    GLUF 97 08/09/2022    CALCIUM 9 8 08/09/2022    AST 28 08/09/2022    ALT 31 08/09/2022    ALKPHOS 91 08/09/2022    EGFR 71 08/09/2022       Patient voiced understanding and agreement in the above discussion  Aware to contact our office with questions/symptoms in the interim  This note has been generated by voice recognition software system  Therefore, there may be spelling, grammar, and or syntax errors  Please contact if questions arise

## 2022-11-08 ENCOUNTER — HOSPITAL ENCOUNTER (OUTPATIENT)
Dept: ULTRASOUND IMAGING | Facility: HOSPITAL | Age: 72
Discharge: HOME | End: 2022-11-08

## 2022-11-08 DIAGNOSIS — R71.8 ELEVATED HEMATOCRIT: ICD-10-CM

## 2022-11-09 ENCOUNTER — APPOINTMENT (OUTPATIENT)
Dept: LAB | Facility: HOSPITAL | Age: 72
End: 2022-11-09

## 2022-11-09 DIAGNOSIS — D53.9 NUTRITIONAL ANEMIA: ICD-10-CM

## 2022-11-09 DIAGNOSIS — R71.8 ELEVATED HEMATOCRIT: ICD-10-CM

## 2022-11-09 LAB
ALBUMIN SERPL BCP-MCNC: 4.3 G/DL (ref 3.5–5)
ALP SERPL-CCNC: 76 U/L (ref 34–104)
ALT SERPL W P-5'-P-CCNC: 19 U/L (ref 7–52)
ANION GAP SERPL CALCULATED.3IONS-SCNC: 8 MMOL/L (ref 4–13)
AST SERPL W P-5'-P-CCNC: 23 U/L (ref 13–39)
BASOPHILS # BLD AUTO: 0.02 THOUSANDS/ÂΜL (ref 0–0.1)
BASOPHILS NFR BLD AUTO: 0 % (ref 0–1)
BILIRUB SERPL-MCNC: 1.26 MG/DL (ref 0.2–1)
BUN SERPL-MCNC: 12 MG/DL (ref 5–25)
CALCIUM SERPL-MCNC: 10 MG/DL (ref 8.4–10.2)
CHLORIDE SERPL-SCNC: 103 MMOL/L (ref 96–108)
CO2 SERPL-SCNC: 31 MMOL/L (ref 21–32)
CREAT SERPL-MCNC: 0.8 MG/DL (ref 0.6–1.3)
CRP SERPL QL: 1.4 MG/L
EOSINOPHIL # BLD AUTO: 0.12 THOUSAND/ÂΜL (ref 0–0.61)
EOSINOPHIL NFR BLD AUTO: 2 % (ref 0–6)
ERYTHROCYTE [DISTWIDTH] IN BLOOD BY AUTOMATED COUNT: 13 % (ref 11.6–15.1)
ERYTHROCYTE [SEDIMENTATION RATE] IN BLOOD: 25 MM/HOUR (ref 0–29)
FERRITIN SERPL-MCNC: 450 NG/ML (ref 8–388)
FOLATE SERPL-MCNC: 15.5 NG/ML (ref 3.1–17.5)
GAS + CO PNL BLDA: 1.4 % (ref 0–1.5)
GFR SERPL CREATININE-BSD FRML MDRD: 75 ML/MIN/1.73SQ M
GLUCOSE P FAST SERPL-MCNC: 105 MG/DL (ref 65–99)
HCT VFR BLD AUTO: 48.3 % (ref 34.8–46.1)
HGB BLD-MCNC: 15 G/DL (ref 11.5–15.4)
IMM GRANULOCYTES # BLD AUTO: 0.02 THOUSAND/UL (ref 0–0.2)
IMM GRANULOCYTES NFR BLD AUTO: 0 % (ref 0–2)
IRON SATN MFR SERPL: 38 % (ref 15–50)
IRON SERPL-MCNC: 125 UG/DL (ref 50–170)
LDH SERPL-CCNC: 143 U/L (ref 140–271)
LYMPHOCYTES # BLD AUTO: 0.98 THOUSANDS/ÂΜL (ref 0.6–4.47)
LYMPHOCYTES NFR BLD AUTO: 18 % (ref 14–44)
MCH RBC QN AUTO: 30.5 PG (ref 26.8–34.3)
MCHC RBC AUTO-ENTMCNC: 31.1 G/DL (ref 31.4–37.4)
MCV RBC AUTO: 98 FL (ref 82–98)
MONOCYTES # BLD AUTO: 0.43 THOUSAND/ÂΜL (ref 0.17–1.22)
MONOCYTES NFR BLD AUTO: 8 % (ref 4–12)
NEUTROPHILS # BLD AUTO: 3.76 THOUSANDS/ÂΜL (ref 1.85–7.62)
NEUTS SEG NFR BLD AUTO: 72 % (ref 43–75)
NRBC BLD AUTO-RTO: 0 /100 WBCS
PLATELET # BLD AUTO: 221 THOUSANDS/UL (ref 149–390)
PMV BLD AUTO: 9.4 FL (ref 8.9–12.7)
POTASSIUM SERPL-SCNC: 4.2 MMOL/L (ref 3.5–5.3)
PROT SERPL-MCNC: 7.3 G/DL (ref 6.4–8.4)
RBC # BLD AUTO: 4.91 MILLION/UL (ref 3.81–5.12)
SODIUM SERPL-SCNC: 142 MMOL/L (ref 135–147)
TIBC SERPL-MCNC: 330 UG/DL (ref 250–450)
VIT B12 SERPL-MCNC: 307 PG/ML (ref 100–900)
WBC # BLD AUTO: 5.33 THOUSAND/UL (ref 4.31–10.16)

## 2022-11-10 LAB — EPO SERPL-ACNC: 6.8 MIU/ML (ref 2.6–18.5)

## 2022-11-17 LAB — MISCELLANEOUS LAB TEST RESULT: NORMAL

## 2022-11-22 ENCOUNTER — APPOINTMENT (OUTPATIENT)
Dept: RADIOLOGY | Facility: CLINIC | Age: 72
End: 2022-11-22

## 2022-11-22 ENCOUNTER — OFFICE VISIT (OUTPATIENT)
Dept: HEMATOLOGY ONCOLOGY | Facility: CLINIC | Age: 72
End: 2022-11-22

## 2022-11-22 VITALS
TEMPERATURE: 98.1 F | WEIGHT: 163.4 LBS | SYSTOLIC BLOOD PRESSURE: 118 MMHG | RESPIRATION RATE: 18 BRPM | OXYGEN SATURATION: 99 % | HEART RATE: 61 BPM | BODY MASS INDEX: 34.3 KG/M2 | HEIGHT: 58 IN | DIASTOLIC BLOOD PRESSURE: 60 MMHG

## 2022-11-22 DIAGNOSIS — M25.561 ACUTE PAIN OF RIGHT KNEE: ICD-10-CM

## 2022-11-22 DIAGNOSIS — R71.8 ELEVATED HEMATOCRIT: Primary | ICD-10-CM

## 2022-11-22 DIAGNOSIS — E53.8 B12 DEFICIENCY: ICD-10-CM

## 2022-11-22 DIAGNOSIS — R79.89 ELEVATED FERRITIN: ICD-10-CM

## 2022-11-22 NOTE — PROGRESS NOTES
Hematology/Oncology Outpatient Follow-up  Yoseph Thomas 73 y o  female 1950 [id number]    Date:  11/22/2022      Assessment and Plan:  1  Elevated hematocrit  Patient continues to have mildly elevated hematocrit 48 3% which has been ongoing at least since 2014  Her additional workup did not reveal any significant findings  However lab only process JAK2 Exon 12-15 which came back negative and does not completely rule out myeloproliferative disorder  Will aim to check JAK2 [id number] mutation before her next office visit for completeness sake  Etiology of her elevated hematocrit is still not entirely clear  Could be due to volume depletion/dehydration as she admits she is not hydrating herself as much as she should; encouraged her to do so  Obstructive sleep apnea could also be considered in the future if etiology remains unclear; she does admit to snoring, but does not seem very enthusiastic about proceeding with sleep study  We will arrange another follow-up appointment in the springtime with repeat labs for close monitoring and also check additional JAK2 study as mentioned above which was missed  - CBC and differential; Future  - Comprehensive metabolic panel; Future  - C-reactive protein; Future  - Sedimentation rate, automated; Future  - Erythropoietin; Future  - LORNE 2; Future    2  B12 deficiency  Patient was noted to be mildly B12 deficient 307  Recommended she start B12 supplement few times per week  Script sent to her local pharmacy  - Vitamin B12; Future  - cyanocobalamin (VITAMIN B-12) 1000 MCG tablet; Take 1 tablet (1,000 mcg total) by mouth daily  Dispense: 90 tablet; Refill: 3    3  Elevated ferritin  Patient was noted to have mild elevation of her ferritin 450 which could be reactive in nature since her remaining iron panel is normal, iron saturation 38%  She denies any family history of iron disorders    We will continue to monitor closely should we see significant increase/persistent elevation of her ferritin additional workup could be pursued  - Iron Panel (Includes Ferritin, Iron Sat%, Iron, and TIBC); Future  - Ferritin; Future    4  Acute pain of right knee  Patient has been experiencing discomfort to her right knee since she injured it about a week or 2 ago coming down some stairs  The pain seems to be worse with activity  Encouraged her to continue conservative measures using Tylenol as needed as well as topical analgesia  Could consider wearing knee brace  We will pursue plain film of the right knee to rule out any significant injury/trauma to the area  - XR knee 4+ vw right injury; Future    HPI:  Patient is a 81-year-old female who originally presents for further evaluation of her elevated hematocrit/RBCs  She has no significant past medical history other than abnormal Pap smear status post hysterectomy; is being monitored by gynecology oncology      She has never smoked and denies any secondhand smoke exposure  Denied any constitutional symptoms  Denied early satiety, rash, headaches, dizziness or aquagenic pruritus  Denies any history of lung disease  She does admits that she was told in the past that she snores on occasion but denies fatigue/daytime sleepiness or any episodes of awakening gasping for air/choking  Patient states that she is very active and does not like to sit still  She still works 1 day a week part-time and otherwise is staying active running errands or doing things around the house  She does consume a significant amount of coffee on a daily basis and admits she does not drink a lot of water during the day 1-2, 8 oz bottles if that      Her laboratory studies from 08/09/2022 showed normal WBC 5 64, RBC slightly above average 5 17, hemoglobin high normal 15 1, hematocrit mildly elevated 48 1%, MCV normal 93 platelet count normal 230  Total bili slightly above average 1 03 otherwise normal CMP    She does not have a lot of prior laboratory studies available for review, but did note mild elevation of her H&H/RBC on her CBC from September 2014  Interval history:  Patient presents today for a follow-up visit  She mentions that she has been experiencing some right knee pain since she injured it about a week or 2 ago  States she was coming down some steps and landed wrong on her right foot/leg  Noted significant pain to her knee after the incident  She denies any swelling, bruising  Is not having significant pain when she is resting but does notice worsening achiness when she is moving around/active  She has been taking Tylenol Arthritis  Otherwise she is doing well and has no new complaints  Admits that she still is most likely not drinking as much water as she should on a daily basis  Her most recent laboratory studies from 11/09/2022 showed normal white cells and platelets, RBC is normal 4 91, hemoglobin normal 15 her hematocrit continues to be higher than average 48 3%, MCV 98  Glucose 105, total bili 1 26 remaining metabolic panel is normal   Her inflammatory markers are not elevated  Erythropoietin 6 8  Carboxyhemoglobin level normal 1 4%  LDH is normal 143  Her B12 is lower than average 307  Folic acid is appropriate  Iron panel showed normal iron saturation 30%, TIBC 330, serum iron 125 with ferritin elevated 450  Reflexive JAK2 mutational studies were ordered unfortunately the lab only checked JAK2 exons 12-15 which came back negative  She did have an ultrasound of the abdomen done 11/08/2022 which was completely normal     ROS: Review of Systems   Constitutional: Negative for activity change, appetite change, chills, fatigue, fever and unexpected weight change  HENT: Negative for congestion, dental problem, mouth sores, nosebleeds, sore throat and trouble swallowing  Eyes: Negative  Respiratory: Negative for cough, chest tightness and shortness of breath      Cardiovascular: Negative for chest pain, palpitations and leg swelling  Gastrointestinal: Negative for abdominal distention, abdominal pain, blood in stool, constipation, diarrhea, nausea and vomiting  Genitourinary: Negative for difficulty urinating, dysuria, frequency, hematuria and urgency  Musculoskeletal: Positive for arthralgias  Negative for back pain, gait problem, joint swelling and myalgias  Skin: Negative for color change, pallor and rash  Neurological: Positive for numbness (occasional legs)  Negative for dizziness, weakness, light-headedness and headaches  Hematological: Negative for adenopathy  Does not bruise/bleed easily  Psychiatric/Behavioral: Negative for dysphoric mood and sleep disturbance  The patient is not nervous/anxious  No past medical history on file  Past Surgical History:   Procedure Laterality Date   • APPENDECTOMY     • HYSTERECTOMY         Social History     Socioeconomic History   • Marital status: Single     Spouse name: None   • Number of children: None   • Years of education: None   • Highest education level: None   Occupational History   • None   Tobacco Use   • Smoking status: Never   • Smokeless tobacco: Never   Vaping Use   • Vaping Use: Never used   Substance and Sexual Activity   • Alcohol use: Never   • Drug use: Never   • Sexual activity: Not Currently     Partners: Male   Other Topics Concern   • None   Social History Narrative   • None     Social Determinants of Health     Financial Resource Strain: Low Risk    • Difficulty of Paying Living Expenses: Not hard at all   Food Insecurity: Not on file   Transportation Needs: No Transportation Needs   • Lack of Transportation (Medical): No   • Lack of Transportation (Non-Medical):  No   Physical Activity: Not on file   Stress: Not on file   Social Connections: Not on file   Intimate Partner Violence: Not on file   Housing Stability: Not on file       Family History   Problem Relation Age of Onset   • No Known Problems Mother    • No Known Problems Father Allergies   Allergen Reactions   • Sulfamethoxazole-Trimethoprim    • Penicillins Rash     "fast heart beat"         Current Outpatient Medications:   •  cyanocobalamin (VITAMIN B-12) 1000 MCG tablet, Take 1 tablet (1,000 mcg total) by mouth daily, Disp: 90 tablet, Rfl: 3      Physical Exam:  /60 (BP Location: Left arm, Patient Position: Sitting, Cuff Size: Standard)   Pulse 61   Temp 98 1 °F (36 7 °C)   Resp 18   Ht 4' 10" (1 473 m)   Wt 74 1 kg (163 lb 6 4 oz)   SpO2 99%   BMI 34 15 kg/m²     Physical Exam  Vitals reviewed  Constitutional:       General: She is not in acute distress  Appearance: She is well-developed  She is obese  She is not diaphoretic  HENT:      Head: Normocephalic and atraumatic  Eyes:      General: No scleral icterus  Conjunctiva/sclera: Conjunctivae normal       Pupils: Pupils are equal, round, and reactive to light  Neck:      Thyroid: No thyromegaly  Cardiovascular:      Rate and Rhythm: Normal rate and regular rhythm  Heart sounds: Normal heart sounds  No murmur heard  Pulmonary:      Effort: Pulmonary effort is normal  No respiratory distress  Breath sounds: Normal breath sounds  Abdominal:      General: There is no distension  Palpations: Abdomen is soft  There is no hepatomegaly or splenomegaly  Tenderness: There is no abdominal tenderness  Musculoskeletal:         General: No swelling  Normal range of motion  Cervical back: Normal range of motion and neck supple  Lymphadenopathy:      Cervical: No cervical adenopathy  Upper Body:      Right upper body: No axillary adenopathy  Left upper body: No axillary adenopathy  Skin:     General: Skin is warm and dry  Findings: No erythema or rash  Neurological:      General: No focal deficit present  Mental Status: She is alert and oriented to person, place, and time     Psychiatric:         Mood and Affect: Mood and affect normal          Behavior: Behavior normal  Behavior is cooperative  Thought Content: Thought content normal          Judgment: Judgment normal            Labs:  Lab Results   Component Value Date    WBC 5 33 11/09/2022    HGB 15 0 11/09/2022    HCT 48 3 (H) 11/09/2022    MCV 98 11/09/2022     11/09/2022     Lab Results   Component Value Date    K 4 2 11/09/2022     11/09/2022    CO2 31 11/09/2022    BUN 12 11/09/2022    CREATININE 0 80 11/09/2022    GLUF 105 (H) 11/09/2022    CALCIUM 10 0 11/09/2022    AST 23 11/09/2022    ALT 19 11/09/2022    ALKPHOS 76 11/09/2022    EGFR 75 11/09/2022       Patient voiced understanding and agreement in the above discussion  Aware to contact our office with questions/symptoms in the interim  This note has been generated by voice recognition software system  Therefore, there may be spelling, grammar, and or syntax errors  Please contact if questions arise

## 2022-11-28 ENCOUNTER — TELEPHONE (OUTPATIENT)
Dept: HEMATOLOGY ONCOLOGY | Facility: CLINIC | Age: 72
End: 2022-11-28

## 2022-11-28 NOTE — TELEPHONE ENCOUNTER
Phoned patient to inform of finding below  Patient states knee is better, but sometimes becomes painful  Relayed Livia Rojas's recommendation to follow up with PCP if symptoms do not improve/resolve  Patient agreeable and appreciative of call       ----- Message from Alia Mcnally, [address]  sent at 11/28/2022  9:50 AM EST -----  Regarding: knee xray  Can you please call the patient at your convenience and let her know that her knee x-ray did not show any serious findings such as fracture/fluid/etc  after her recent injury  It did show mild arthritis with a small bone spur  Would recommend following up with her PCP if her symptoms do not improve/resolve  Let me know if she has any further questions or concerns    Thanks    Robby Garcia

## 2023-01-31 ENCOUNTER — HOSPITAL ENCOUNTER (OUTPATIENT)
Dept: ULTRASOUND IMAGING | Facility: HOSPITAL | Age: 73
Discharge: HOME | End: 2023-01-31
Attending: FAMILY MEDICINE

## 2023-01-31 ENCOUNTER — TELEPHONE (OUTPATIENT)
Dept: FAMILY MEDICINE CLINIC | Facility: CLINIC | Age: 73
End: 2023-01-31

## 2023-01-31 DIAGNOSIS — N60.02 CYST OF LEFT BREAST: ICD-10-CM

## 2023-02-01 NOTE — PROGRESS NOTES
Call placed to patient regarding recommendation for;    _____ RIGHT ___X___LEFT      __X___Ultrasound guided  ______Stereotactic breast biopsy  Pt states that procedure was explained to her, additional questions answered at this time    __X___Verbalized understanding        Blood thinners: No: __X___ Yes: _____ What:     Biopsy teaching sheet given:  _____yes __X__no (All teaching points discussed during call, pt with no questions at this time, pt adv to arrive at 0730 for 0800 biopsy)    Pt given name/# for any further questions/needs    Pt agreeable to a post procedure call and states we can give her biopsy results to her over the phone

## 2023-02-02 ENCOUNTER — RA CDI HCC (OUTPATIENT)
Dept: OTHER | Facility: HOSPITAL | Age: 73
End: 2023-02-02

## 2023-02-02 NOTE — PROGRESS NOTES
[facility] coding opportunities       Chart reviewed, no opportunity found: CHART REVIEWED, NO OPPORTUNITY FOUND        Patients Insurance     Medicare Insurance: Medicare

## 2023-02-09 ENCOUNTER — OFFICE VISIT (OUTPATIENT)
Dept: FAMILY MEDICINE CLINIC | Facility: CLINIC | Age: 73
End: 2023-02-09

## 2023-02-09 VITALS
DIASTOLIC BLOOD PRESSURE: 72 MMHG | HEIGHT: 58 IN | TEMPERATURE: 97.3 F | HEART RATE: 68 BPM | OXYGEN SATURATION: 98 % | SYSTOLIC BLOOD PRESSURE: 124 MMHG | BODY MASS INDEX: 34 KG/M2 | WEIGHT: 162 LBS

## 2023-02-09 DIAGNOSIS — N63.20 MASS OF LEFT BREAST, UNSPECIFIED QUADRANT: Primary | ICD-10-CM

## 2023-02-09 NOTE — PROGRESS NOTES
Name: Anmol Winslow      : 1950      MRN: [id number]  Encounter Provider: Nathan Carey MD  Encounter Date: 2023   Encounter department: [facility] AT [address]     1  Mass of left breast, unspecified quadrant  Assessment & Plan:  Unclear etiology of breast mass   Malignant versus benign etiology   Reviewed breast imaging with patient that showed left breast mass at 3:00, 3 cm from the nipple is suspicious  She is scheduled for biopsy soon  All of her questions were answered  I will follow-up with her when results of biopsy come back  Subjective      Presents office to review recent ultrasound of breast   It showed breast mass  She has biopsy scheduled soon  She has no questions but is a little concerned  She says it is what it is and nothing can be done right now to change it  She plans to follow-up with biopsy and get treatment if necessary  Review of Systems   Constitutional: Negative for activity change, appetite change, chills, diaphoresis, fatigue, fever and unexpected weight change  HENT: Negative for ear pain and sore throat  Eyes: Negative for pain and visual disturbance  Respiratory: Negative for cough and shortness of breath  Cardiovascular: Negative for chest pain and palpitations  Gastrointestinal: Negative for abdominal pain and vomiting  Genitourinary: Negative for dysuria and hematuria  Musculoskeletal: Negative for arthralgias and back pain  Skin: Negative for color change and rash  Neurological: Negative for seizures and syncope  All other systems reviewed and are negative        Current Outpatient Medications on File Prior to Visit   Medication Sig   • cyanocobalamin (VITAMIN B-12) 1000 MCG tablet Take 1 tablet (1,000 mcg total) by mouth daily (Patient not taking: Reported on 2023)       Objective     /72 (BP Location: Left arm, Patient Position: Sitting)   Pulse 68   Temp (!) 97 3 °F (36 3 °C)  4' 10" (1 473 m)   Wt 73 5 kg (162 lb)   SpO2 98%   BMI 33 86 kg/m²     Physical Exam  Vitals and nursing note reviewed  Constitutional:       General: She is not in acute distress  Appearance: Normal appearance  She is not ill-appearing, toxic-appearing or diaphoretic  Eyes:      General:         Right eye: No discharge  Left eye: No discharge  Extraocular Movements: Extraocular movements intact  Conjunctiva/sclera: Conjunctivae normal    Cardiovascular:      Rate and Rhythm: Normal rate  Pulmonary:      Effort: Pulmonary effort is normal    Musculoskeletal:      Cervical back: Normal range of motion and neck supple  Neurological:      Mental Status: She is alert and oriented to person, place, and time  Psychiatric:         Mood and Affect: Mood normal          Behavior: Behavior normal          Thought Content:  Thought content normal          Judgment: Judgment normal        Daija Gomez MD

## 2023-02-09 NOTE — ASSESSMENT & PLAN NOTE
Unclear etiology of breast mass   Malignant versus benign etiology   Reviewed breast imaging with patient that showed left breast mass at 3:00, 3 cm from the nipple is suspicious  She is scheduled for biopsy soon  All of her questions were answered  I will follow-up with her when results of biopsy come back

## 2023-02-14 ENCOUNTER — HOSPITAL ENCOUNTER (OUTPATIENT)
Dept: ULTRASOUND IMAGING | Facility: HOSPITAL | Age: 73
Discharge: HOME | End: 2023-02-14

## 2023-02-14 ENCOUNTER — HOSPITAL ENCOUNTER (OUTPATIENT)
Dept: MAMMOGRAPHY | Facility: HOSPITAL | Age: 73
Discharge: HOME | End: 2023-02-14

## 2023-02-14 VITALS — SYSTOLIC BLOOD PRESSURE: 149 MMHG | HEART RATE: 76 BPM | DIASTOLIC BLOOD PRESSURE: 77 MMHG

## 2023-02-14 DIAGNOSIS — R92.8 ABNORMAL ULTRASOUND OF BREAST: ICD-10-CM

## 2023-02-14 DIAGNOSIS — N60.02 CYST OF LEFT BREAST: ICD-10-CM

## 2023-02-14 RX ORDER — LIDOCAINE HYDROCHLORIDE 10 MG/ML
5 INJECTION, SOLUTION EPIDURAL; INFILTRATION; INTRACAUDAL; PERINEURAL ONCE
Status: COMPLETED | OUTPATIENT
Start: 2023-02-14 | End: 2023-02-14

## 2023-02-14 RX ADMIN — LIDOCAINE HYDROCHLORIDE 5 ML: 10 INJECTION, SOLUTION EPIDURAL; INFILTRATION; INTRACAUDAL; PERINEURAL at 08:27

## 2023-02-14 NOTE — PROGRESS NOTES
Procedure type:    __X___ultrasound guided _____stereotactic    Breast:    __X___Left _____Right    Location:3 OCLOCK, 3 CMFN    Needle:12G LELO    # of passes: 2    Clip: HYDROMARK BUTTERFLY    Performed by:DR Lamar Hawthorne    Pressure held for 5 minutes by:JERE SARKAR    Steri Strips:    ___X__yes _____no    Band aid:    __X___yes_____no    Tolerated procedure:    __X___yes _____no

## 2023-02-14 NOTE — DISCHARGE INSTR - OTHER ORDERS
Ice pack given:    __X___yes _____no    Discharge instructions given to patient:    __X___yes _____no    Discharged via:    __X___amulatory    _____wheelchair    _____stretcher    Stable on discharge:    __X___yes ____no

## 2023-02-15 NOTE — PROGRESS NOTES
Post procedure call completed    Bleeding: _____yes __X___no (pt denies)    Pain: _____yes ___X___no (pt denies - used ice pack yesterday   No meds)     Redness/Swelling: ______yes ___X___no (pt denies, reports mild bruising)    Band aid removed: __X___yes _____no (removed today)    Steri-Strips intact: ___X___yes _____no (discussed with patient to remove steri strips on Sunday if they have not come off on their own)    Pt with no questions at this time, adv will call when results available, adv to call with any questions or concerns, has name/# for contact

## 2023-02-17 ENCOUNTER — TELEPHONE (OUTPATIENT)
Dept: MAMMOGRAPHY | Facility: CLINIC | Age: 73
End: 2023-02-17

## 2023-02-17 NOTE — TELEPHONE ENCOUNTER
Call placed to patient and she was given breast biopsy results, questions answered, adv next step is to set patient up with surgeon, options discussed and pt would like to have appt made with Dr Hillary Walters, adv patient that I would call her back by Monday with appt, pt states understanding, pt with no questions at this time, has name/# for any further needs

## 2023-03-09 ENCOUNTER — OFFICE VISIT (OUTPATIENT)
Dept: SURGERY | Facility: CLINIC | Age: 73
End: 2023-03-09

## 2023-03-09 VITALS
HEIGHT: 58 IN | BODY MASS INDEX: 34.22 KG/M2 | SYSTOLIC BLOOD PRESSURE: 134 MMHG | OXYGEN SATURATION: 98 % | HEART RATE: 76 BPM | TEMPERATURE: 98.4 F | DIASTOLIC BLOOD PRESSURE: 74 MMHG | WEIGHT: 163 LBS

## 2023-03-09 DIAGNOSIS — D24.2 INTRADUCTAL PAPILLOMA OF BREAST, LEFT: Primary | ICD-10-CM

## 2023-03-12 PROBLEM — D24.2 INTRADUCTAL PAPILLOMA OF BREAST, LEFT: Status: ACTIVE | Noted: 2023-03-12

## 2023-03-12 NOTE — PROGRESS NOTES
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  She is asymptomatic  The breast radiologist felt that the papilloma has some high risk imaging features including microlobulated margins, internal vascularity, heterogeneous appearance, not stable from prior appearance  These were reviewed with the patient  She understands that the lesion is benign and concordant but that some of the features above lead the radiologist to suggest surgical excisional biopsy of the site to rule out an upgrade to malignancy  She has a standard clip in place but the lesion would be amenable to [facility]  localization  The patient would like to follow the area closely before deciding on any surgical procedure on her breast  I have ordered a bilateral diagnostic mammogram and a left breast diagnostic ultrasound for 6/2023  We will see her again clinically following that to examine her and rediscuss options  She will contact me sooner if she develops any symptoms referable to her breasts and nipples  History of Present Illness   Chief Complaint   Patient presents with   • Consult     The patient has recently transferred her care to [facility]  She had a screening mammogram in 6/2022 at an outside site  Per the patient, she also had an ultrasound at that time  It was then recommended that she have 6 month ultrasound follow up which was performed in 1/2023 showing an 8 x 5 x 5mm lesion at 3 oclock, 3cm from the nipple, with internal vascularity, normal axilla  She was recommended to have a core needle biopsy which was performed showing an intraductal papilloma, no atypia, benign and concordant, standard clip in place  No prior abnormal mammograms or breast biopsies or procedures   No nipple pain or drainage, no palpable breast findings, no symptoms referable to the breasts  Menarche - 16  Pregnancies - 3 (1 live birth, age 57)  Age at youngest pregnancy - first pregnancy age 14, had miscarriage, only pregnancy carried to term, age 57  OCP - 21 years  Menopause - 50  HRT - no  Personal - hysterectomy age 49, had TAHBSO, history of HPV, history of prior GYN procedures for neoplasia, no cancer, also had prior ablation procedure, no prior breast biopsies or procedures  Family - denies any cancer history in the family      Review of Systems   Constitutional: Negative  Respiratory: Negative  Cardiovascular: Negative  Skin: Negative  Hematological: Negative  All other systems reviewed and are negative  Historical Information   History reviewed  No pertinent past medical history    Past Surgical History:   Procedure Laterality Date   • APPENDECTOMY     • HYSTERECTOMY     • US GUIDED BREAST BIOPSY LEFT COMPLETE Left 2/14/2023     Social History   Social History     Substance and Sexual Activity   Alcohol Use Never     Social History     Substance and Sexual Activity   Drug Use Never     Social History     Tobacco Use   Smoking Status Never   Smokeless Tobacco Never     Family History   Problem Relation Age of Onset   • No Known Problems Mother    • No Known Problems Father    • Breast cancer Neg Hx        Meds/Allergies     Current Outpatient Medications:   •  cyanocobalamin (VITAMIN B-12) 1000 MCG tablet, Take 1 tablet (1,000 mcg total) by mouth daily (Patient not taking: Reported on 2/9/2023), Disp: 90 tablet, Rfl: 3  Allergies   Allergen Reactions   • Sulfamethoxazole-Trimethoprim    • Penicillins Rash     "fast heart beat"       The following portions of the patient's history were reviewed and updated as appropriate: allergies, current medications, past family history, past medical history, past social history, past surgical history and problem list     Objective   Current Vitals:   Blood pressure 134/74, pulse 76, temperature 98 4 °F (36 9 °C), temperature source Temporal, height 4' 10" (1 473 m), weight 73 9 kg (163 lb), SpO2 98 %, not currently breastfeeding  Physical Exam  Vitals reviewed  Constitutional:       General: She is not in acute distress  Appearance: She is not toxic-appearing  HENT:      Head: Normocephalic  Mouth/Throat:      Mouth: Mucous membranes are moist    Eyes:      Pupils: Pupils are equal, round, and reactive to light  Cardiovascular:      Rate and Rhythm: Normal rate  Pulmonary:      Effort: Pulmonary effort is normal  No respiratory distress  Musculoskeletal:         General: Normal range of motion  Cervical back: Normal range of motion and neck supple  Lymphadenopathy:      Cervical: No cervical adenopathy  Skin:     General: Skin is warm and dry  Capillary Refill: Capillary refill takes less than 2 seconds  Neurological:      General: No focal deficit present  Mental Status: She is alert  Psychiatric:         Mood and Affect: Mood normal      The patient has no palpable cervical, supraclavicular, or axillary lymphadenopathy bilaterally  The breasts are symmetric  There are no dominant lumps, masses, skin changes or nipple retraction bilaterally  Signature:   Yesenia Dodge MD  Date: 3/12/2023 Time: 10:09 AM

## 2023-04-25 ENCOUNTER — OFFICE VISIT (OUTPATIENT)
Dept: HEMATOLOGY ONCOLOGY | Facility: CLINIC | Age: 73
End: 2023-04-25

## 2023-04-25 VITALS
RESPIRATION RATE: 16 BRPM | SYSTOLIC BLOOD PRESSURE: 130 MMHG | HEIGHT: 58 IN | TEMPERATURE: 98 F | HEART RATE: 73 BPM | DIASTOLIC BLOOD PRESSURE: 80 MMHG | WEIGHT: 162 LBS | OXYGEN SATURATION: 97 % | BODY MASS INDEX: 34 KG/M2

## 2023-04-25 DIAGNOSIS — E53.8 B12 DEFICIENCY: ICD-10-CM

## 2023-04-25 DIAGNOSIS — M79.89 MASS OF SOFT TISSUE: ICD-10-CM

## 2023-04-25 DIAGNOSIS — R71.8 ELEVATED HEMATOCRIT: Primary | ICD-10-CM

## 2023-04-25 DIAGNOSIS — R79.89 ELEVATED FERRITIN: ICD-10-CM

## 2023-04-25 NOTE — PROGRESS NOTES
Hematology/Oncology Outpatient Follow-up  Patience Nelson 81 y o  female 1950 [id number]    Date:  4/25/2023      Assessment and Plan:  1  Elevated hematocrit  Patient's hematocrit is no longer elevated according to her most recent CBC  She had extensive work-up done which did not reveal any significant findings  She does admit that she has been hydrating herself better with water and encouraged her to do so  Suspect her slightly elevated hematocrit may have been due to dehydration/volume depletion  She was told that we can continue to monitor her laboratory studies annually or even consider deferring management back to her PCP  She would prefer to be seen at least once a year  Can see her sooner if there would be a need  - CBC and differential; Future  - Comprehensive metabolic panel; Future  - C-reactive protein; Future  - Sedimentation rate, automated; Future  - Erythropoietin; Future    2  Elevated ferritin  Patient continues to have mildly elevated ferritin slightly improved from prior study; remaining iron panel is normal which goes against iron overload  Suspect this is likely reactive in nature  We will monitor     - CBC and differential; Future  - Comprehensive metabolic panel; Future  - C-reactive protein; Future  - Sedimentation rate, automated; Future  - Iron Panel (Includes Ferritin, Iron Sat%, Iron, and TIBC); Future  - Ferritin; Future    3  B12 deficiency  Patient's vitamin B12 is in the low normal range  She admits that she never started the B12 supplements  Encouraged her to take a B12 supplement at least once or twice a week  - Vitamin B12; Future    4  Mass of soft tissue  Patient reports today that she did injure her right upper/inner buttock area when she was doing housework several months ago  She has had a lump to that region since that time  It is bothersome with certain activities  It is tender with palpation    For completeness sake we will pursue a soft tissue ultrasound of the area in question  Await results to see if further intervention is necessary or not  - US MSK limited; Future      HPI:  Patient is a 68-year-old female who originally presents for further evaluation of her elevated hematocrit/RBCs   She has no significant past medical history other than abnormal Pap smear status post hysterectomy; is being monitored by gynecology oncology      She has never smoked and denies any secondhand smoke exposure   Denied any constitutional symptoms   Denied early satiety, rash, headaches, dizziness or aquagenic pruritus   Denies any history of lung disease   She does admits that she was told in the past that she snores on occasion but denies fatigue/daytime sleepiness or any episodes of awakening gasping for air/choking   Patient states that she is very active and does not like to sit still   She still works 1 day a week part-time and otherwise is staying active running errands or doing things around the house   She does consume a significant amount of coffee on a daily basis and admits she does not drink a lot of water during the day 1-2, 8 oz bottles if that      Her laboratory studies from 08/09/2022 showed normal WBC 5 64, RBC slightly above average 5 17, hemoglobin high normal 15 1, hematocrit mildly elevated 48 1%, MCV normal 93 platelet count normal 230  Total bili slightly above average 1 03 otherwise normal CMP   She does not have a lot of prior laboratory studies available for review, but did note mild elevation of her H&H/RBC on her CBC from September 2014  Her most recent laboratory studies from 11/09/2022 showed normal white cells and platelets, RBC is normal 4 91, hemoglobin normal 15 her hematocrit continues to be higher than average 48 3%, MCV 98  Glucose 105, total bili 1 26 remaining metabolic panel is normal   Her inflammatory markers are not elevated  Erythropoietin 6 8  Carboxyhemoglobin level normal 1 4%  LDH is normal 143    Her B12 is lower than average 307  Folic acid is appropriate  Iron panel showed normal iron saturation 30%, TIBC 330, serum iron 125 with ferritin elevated 450  Reflexive JAK2 mutational studies were ordered unfortunately the lab only checked JAK2 exons 12-15 which came back negative  Additional work-up 11/09/2022 showed normal white cells and platelets, RBC is normal 4 91, hemoglobin normal 15 her hematocrit continues to be higher than average 48 3%, MCV 98  Glucose 105, total bili 1 26 remaining metabolic panel is normal   Her inflammatory markers are not elevated  Erythropoietin 6 8  Carboxyhemoglobin level normal 1 4%  LDH is normal 143  Her B12 is lower than average 307  Folic acid is appropriate  Iron panel showed normal iron saturation 30%, TIBC 330, serum iron 125 with ferritin elevated 450  Reflexive JAK2 mutational studies were ordered unfortunately the lab only checked JAK2 exons 12-15 which came back negative  She then had JAK2 V617F test 4/2023 which came back negative  Interval history:  Patient presents today for follow-up visit  She recently had abnormal mammogram and ultimately was found to have papilloma  Being monitored by breast surgery and she is considering getting it surgically removed  She does report some aches and pains but attributes this to doing a lot of housework and renovations around the home  She mentions that she did injure/bump her lower right back several months ago when she was doing housework and since that time developed a lump  The lump has persisted and is painful with palpation and certain activities/movements  She admits that she is trying to drink more water has found that she tends to drink more when she is consuming the smaller bottles of water frequently throughout the day    She mentions that she continues to have some dental issues and needs to have some teeth extracted in the future which was deferred for quite some time due to COVID/etc  She was supposed to start some B12 supplements on occasion but admits she never did so  Her most recent laboratory studies from 4/18/2023 showed normal CBC her red cells are no longer elevated H&H 14 8/46  Total bili is again slightly elevated 1 52 but remaining metabolic panel normal   Inflammatory markers are not elevated  Erythropoietin normal 8 9  B12 low normal 405  Her ferritin continues to be slightly elevated 431 but unlikely to be iron overloaded as her remaining iron panel is normal iron saturation 39%, TIBC 326, serum iron 126  ROS: Review of Systems   HENT: Positive for dental problem  Musculoskeletal: Positive for arthralgias and myalgias  Neurological: Positive for numbness  Psychiatric/Behavioral: Positive for sleep disturbance  All other systems reviewed and are negative  No past medical history on file  Past Surgical History:   Procedure Laterality Date   • APPENDECTOMY     • HYSTERECTOMY     • US GUIDED BREAST BIOPSY LEFT COMPLETE Left 2/14/2023       Social History     Socioeconomic History   • Marital status: Single     Spouse name: Not on file   • Number of children: Not on file   • Years of education: Not on file   • Highest education level: Not on file   Occupational History   • Not on file   Tobacco Use   • Smoking status: Never   • Smokeless tobacco: Never   Vaping Use   • Vaping Use: Never used   Substance and Sexual Activity   • Alcohol use: Never   • Drug use: Never   • Sexual activity: Not Currently     Partners: Male   Other Topics Concern   • Not on file   Social History Narrative   • Not on file     Social Determinants of Health     Financial Resource Strain: Low Risk    • Difficulty of Paying Living Expenses: Not hard at all   Food Insecurity: Not on file   Transportation Needs: No Transportation Needs   • Lack of Transportation (Medical): No   • Lack of Transportation (Non-Medical):  No   Physical Activity: Not on file   Stress: Not on file   Social Connections: Not on "file   Intimate Partner Violence: Not on file   Housing Stability: Not on file       Family History   Problem Relation Age of Onset   • No Known Problems Mother    • No Known Problems Father    • Breast cancer Neg Hx        Allergies   Allergen Reactions   • Sulfamethoxazole-Trimethoprim    • Penicillins Rash     \"fast heart beat\"         Current Outpatient Medications:   •  cyanocobalamin (VITAMIN B-12) 1000 MCG tablet, Take 1 tablet (1,000 mcg total) by mouth daily (Patient not taking: Reported on 2/9/2023), Disp: 90 tablet, Rfl: 3      Physical Exam:  /80 (BP Location: Right arm, Patient Position: Sitting, Cuff Size: Adult)   Pulse 73   Temp 98 °F (36 7 °C)   Resp 16   Ht 4' 10\" (1 473 m)   Wt 73 5 kg (162 lb)   SpO2 97%   BMI 33 86 kg/m²     Physical Exam  Vitals reviewed  Constitutional:       General: She is not in acute distress  Appearance: She is well-developed  She is obese  She is not diaphoretic  HENT:      Head: Normocephalic and atraumatic  Eyes:      General: No scleral icterus  Conjunctiva/sclera: Conjunctivae normal       Pupils: Pupils are equal, round, and reactive to light  Neck:      Thyroid: No thyromegaly  Cardiovascular:      Rate and Rhythm: Normal rate and regular rhythm  Heart sounds: Normal heart sounds  No murmur heard  Pulmonary:      Effort: Pulmonary effort is normal  No respiratory distress  Breath sounds: Normal breath sounds  Abdominal:      General: There is no distension  Palpations: Abdomen is soft  There is no hepatomegaly or splenomegaly  Tenderness: There is no abdominal tenderness  Musculoskeletal:         General: No swelling  Normal range of motion  Cervical back: Normal range of motion and neck supple  Lymphadenopathy:      Cervical: No cervical adenopathy  Upper Body:      Right upper body: No axillary adenopathy  Left upper body: No axillary adenopathy  Skin:     General: Skin is warm and dry      " Findings: No erythema or rash  Neurological:      General: No focal deficit present  Mental Status: She is alert and oriented to person, place, and time  Psychiatric:         Mood and Affect: Mood and affect normal          Behavior: Behavior normal  Behavior is cooperative  Thought Content: Thought content normal          Judgment: Judgment normal            Labs:  Lab Results   Component Value Date    WBC 5 44 04/18/2023    HGB 14 8 04/18/2023    HCT 46 0 04/18/2023    MCV 95 04/18/2023     04/18/2023        Patient voiced understanding and agreement in the above discussion  Aware to contact our office with questions/symptoms in the interim  This note has been generated by voice recognition software system  Therefore, there may be spelling, grammar, and or syntax errors  Please contact if questions arise

## 2023-05-02 ENCOUNTER — HOSPITAL ENCOUNTER (OUTPATIENT)
Dept: ULTRASOUND IMAGING | Facility: HOSPITAL | Age: 73
Discharge: HOME | End: 2023-05-02

## 2023-05-02 DIAGNOSIS — M79.89 MASS OF SOFT TISSUE: ICD-10-CM

## 2023-05-04 NOTE — RESULT ENCOUNTER NOTE
Called patient to review her ultrasound results  The area to her right upper buttock area seems to be consistent with lipoma  I did recommend if the area continues to bother her to discuss with her surgeon  She does already have follow-up with her surgeon scheduled next month

## 2023-06-05 ENCOUNTER — HOSPITAL ENCOUNTER (OUTPATIENT)
Dept: ULTRASOUND IMAGING | Facility: CLINIC | Age: 73
Discharge: HOME | End: 2023-06-05
Attending: SURGERY
Payer: MEDICARE

## 2023-06-05 ENCOUNTER — HOSPITAL ENCOUNTER (OUTPATIENT)
Dept: MAMMOGRAPHY | Facility: CLINIC | Age: 73
Discharge: HOME | End: 2023-06-05
Attending: SURGERY
Payer: MEDICARE

## 2023-06-05 VITALS — HEIGHT: 58 IN | BODY MASS INDEX: 34 KG/M2 | WEIGHT: 162 LBS

## 2023-06-05 DIAGNOSIS — D24.2 INTRADUCTAL PAPILLOMA OF BREAST, LEFT: ICD-10-CM

## 2023-06-05 PROCEDURE — 77066 DX MAMMO INCL CAD BI: CPT

## 2023-06-05 PROCEDURE — G0279 TOMOSYNTHESIS, MAMMO: HCPCS

## 2023-06-05 PROCEDURE — 76642 ULTRASOUND BREAST LIMITED: CPT

## 2023-06-29 ENCOUNTER — OFFICE VISIT (OUTPATIENT)
Dept: SURGERY | Facility: CLINIC | Age: 73
End: 2023-06-29
Payer: MEDICARE

## 2023-06-29 VITALS
DIASTOLIC BLOOD PRESSURE: 72 MMHG | OXYGEN SATURATION: 98 % | WEIGHT: 160.6 LBS | HEART RATE: 90 BPM | SYSTOLIC BLOOD PRESSURE: 120 MMHG | HEIGHT: 58 IN | RESPIRATION RATE: 16 BRPM | BODY MASS INDEX: 33.71 KG/M2 | TEMPERATURE: 98.2 F

## 2023-06-29 DIAGNOSIS — D24.2 INTRADUCTAL PAPILLOMA OF BREAST, LEFT: Primary | ICD-10-CM

## 2023-06-29 DIAGNOSIS — D17.1 LIPOMA OF BUTTOCK: ICD-10-CM

## 2023-06-29 DIAGNOSIS — Z12.31 ENCOUNTER FOR SCREENING MAMMOGRAM FOR MALIGNANT NEOPLASM OF BREAST: ICD-10-CM

## 2023-06-29 PROCEDURE — 99214 OFFICE O/P EST MOD 30 MIN: CPT | Performed by: SURGERY

## 2023-07-18 PROBLEM — D17.1 LIPOMA OF BUTTOCK: Status: ACTIVE | Noted: 2023-07-18

## 2023-07-18 PROBLEM — Z12.31 ENCOUNTER FOR SCREENING MAMMOGRAM FOR MALIGNANT NEOPLASM OF BREAST: Status: ACTIVE | Noted: 2023-07-18

## 2023-07-19 NOTE — PROGRESS NOTES
Progress Note - Surgical Oncology  Alena Bennett 81 y.o. 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 She is asymptomatic. The breast radiologist felt that the papilloma has some high risk imaging features including microlobulated margins, internal vascularity, heterogeneous appearance, not stable from prior appearance. These were reviewed with the patient. She understands that the lesion is benign and concordant but that some of the features above lead the radiologist to suggest surgical excisional biopsy of the site to rule out an upgrade to malignancy. She has a standard clip in place but the lesion would be amenable to [facility]  localization. After discussing all options at our last visit, we decided to follow the area closely before deciding on any surgical procedure on her breast.     Updated diagnostic mammogram and ultrasound reviewed. The finding is still present but smaller than prior reflecting biopsy changes. There has been no progression, there are no clinical symptoms. Her exam is benign. We will continue to follow closely. We will see her in December after an updated ultrasound. We will plan to obtain her next bilateral diagnostic mammogram and ultrasound next June with an appointment with me to follow. She will contact me sooner if she develops any symptoms referable to her breasts and nipples. She mentioned a right upper medial buttock lesion that I examined today as well, clinically consistent with a lipoma, previous ultrasound of the site from May 2023 reviewed and notes a 2cm mass consistent with a lipoma. I explained to the patient that it appears low risk clinically but that it is reasonable to excise it at any point if it is symptomatic or bothersome to her.  She will let us know if she wants to pursue that but did not want to make formal arrangements now. We will also help to arrange a dermatology skin check for her at her request as she has some skin findings on her upper back that she would like evaluated. They do not appear worrisome to me but I mentioned to her that a thorough skin cancer check should be done by a dermatologist regularly for preventative care. Subjective       Chief Complaint   Patient presents with   • Follow-up      Doing well, no new nipple drainage, nipple changes, breast pain, lumps or bumps, new symptoms of concern. Patient has a stable exam. Updated imaging shows that the known left breast papilloma is slightly smaller than prior reflecting the biopsy that she had, no ominous changes or evolution, will continue to follow closely with 6 month repeat US and appointment. Patient also points out right upper medial buttock lipoma which has been ultrasounded, she is considering having it removed at some point. She mentions some skin lesions on her back which do not appear worrisome to me but I have recommend that she allow us to connect her with a dermatologist for a thorough skin check. Review of Systems   Constitutional: Negative. Skin:        Right buttock lipoma    Stable nonpalpable left breast intraductal papilloma   Hematological: Negative. All other systems reviewed and are negative. The following portions of the patient's history were reviewed and updated as appropriate: allergies, current medications, past family history, past medical history, past social history, past surgical history and problem list.    Objective      Blood pressure 120/72, pulse 90, temperature 98.2 °F (36.8 °C), temperature source Temporal, resp. rate 16, height 4' 10" (1.473 m), weight 72.8 kg (160 lb 9.6 oz), SpO2 98 %, not currently breastfeeding. Physical Exam  Vitals reviewed. Constitutional:       General: She is not in acute distress. Appearance: She is not toxic-appearing. HENT:      Head: Normocephalic. Mouth/Throat:      Mouth: Mucous membranes are moist.   Eyes:      Conjunctiva/sclera: Conjunctivae normal.      Pupils: Pupils are equal, round, and reactive to light. Cardiovascular:      Rate and Rhythm: Normal rate. Pulmonary:      Effort: Pulmonary effort is normal. No respiratory distress. Abdominal:      Palpations: Abdomen is soft. Musculoskeletal:         General: Normal range of motion. Cervical back: Normal range of motion. Skin:     General: Skin is warm and dry. Capillary Refill: Capillary refill takes less than 2 seconds. Comments: Lipoma right medial upper buttock, soft, nontender, about 2cm   Neurological:      General: No focal deficit present. Mental Status: She is alert. Psychiatric:         Mood and Affect: Mood normal.     The patient has no palpable cervical, supraclavicular, or axillary lymphadenopathy bilaterally. The breasts are symmetric. There are no dominant lumps, masses, skin changes or nipple retraction bilaterally. Signature:   Melina Steve MD  Date: 7/18/2023 Time: 9:17 PM

## 2023-09-11 ENCOUNTER — OFFICE VISIT (OUTPATIENT)
Dept: FAMILY MEDICINE CLINIC | Facility: CLINIC | Age: 73
End: 2023-09-11
Payer: MEDICARE

## 2023-09-11 VITALS
SYSTOLIC BLOOD PRESSURE: 130 MMHG | WEIGHT: 162 LBS | TEMPERATURE: 96.6 F | OXYGEN SATURATION: 98 % | DIASTOLIC BLOOD PRESSURE: 88 MMHG | HEIGHT: 58 IN | BODY MASS INDEX: 34 KG/M2 | HEART RATE: 65 BPM

## 2023-09-11 DIAGNOSIS — D24.2 INTRADUCTAL PAPILLOMA OF BREAST, LEFT: ICD-10-CM

## 2023-09-11 DIAGNOSIS — Z00.00 MEDICARE ANNUAL WELLNESS VISIT, SUBSEQUENT: Primary | ICD-10-CM

## 2023-09-11 PROBLEM — N63.20 MASS OF LEFT BREAST: Status: RESOLVED | Noted: 2022-07-29 | Resolved: 2023-09-11

## 2023-09-11 PROBLEM — Z12.31 ENCOUNTER FOR SCREENING MAMMOGRAM FOR MALIGNANT NEOPLASM OF BREAST: Status: RESOLVED | Noted: 2023-07-18 | Resolved: 2023-09-11

## 2023-09-11 PROCEDURE — G0438 PPPS, INITIAL VISIT: HCPCS | Performed by: FAMILY MEDICINE

## 2023-09-11 NOTE — PROGRESS NOTES
Assessment and Plan:     Problem List Items Addressed This Visit        Other    Intraductal papilloma of breast, left     Following with surgical oncology. Has repeat imaging scheduled. Other Visit Diagnoses     Medicare annual wellness visit, subsequent    -  Primary                BMI Counseling: Body mass index is 33.86 kg/m². The BMI is above normal. Nutrition recommendations include decreasing portion sizes, encouraging healthy choices of fruits and vegetables, decreasing fast food intake, consuming healthier snacks, limiting drinks that contain sugar, moderation in carbohydrate intake, increasing intake of lean protein, reducing intake of saturated and trans fat and reducing intake of cholesterol. Exercise recommendations include exercising 3-5 times per week. No pharmacotherapy was ordered. Rationale for BMI follow-up plan is due to patient being overweight or obese. Depression Screening and Follow-up Plan: Patient was screened for depression during today's encounter. They screened negative with a PHQ-2 score of 0. Preventive health issues were discussed with patient, and age appropriate screening tests were ordered as noted in patient's After Visit Summary. Personalized health advice and appropriate referrals for health education or preventive services given if needed, as noted in patient's After Visit Summary. History of Present Illness:     Patient presents for a Medicare Wellness Visit. Feeling well no acute concerns or complaints. Reports has mammogram and ultrasound scheduled. Following with surgical oncology within our network. HPI   Patient Care Team:  Annie Augustin MD as PCP - General (Family Medicine)  Peg Alamo MD (General Surgery)     Review of Systems:     Review of Systems   All other systems reviewed and are negative.        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 No pertinent past medical history. Past Surgical History:   Procedure Laterality Date   • APPENDECTOMY     • BREAST BIOPSY Left 02/14/2023    intraductal papiloma   • HYSTERECTOMY     • US GUIDED BREAST BIOPSY LEFT COMPLETE Left 02/14/2023      Family History:     Family History   Problem Relation Age of Onset   • No Known Problems Mother    • No Known Problems Father    • Breast cancer Sister 71      Social History:     Social History     Socioeconomic History   • Marital status: Single     Spouse name: None   • Number of children: None   • Years of education: None   • Highest education level: None   Occupational History   • None   Tobacco Use   • Smoking status: Never   • Smokeless tobacco: Never   Vaping Use   • Vaping Use: Never used   Substance and Sexual Activity   • Alcohol use: Never   • Drug use: Never   • Sexual activity: Not Currently     Partners: Male   Other Topics Concern   • None   Social History Narrative   • None     Social Determinants of Health     Financial Resource Strain: Low Risk  (9/11/2023)    Overall Financial Resource Strain (CARDIA)    • Difficulty of Paying Living Expenses: Not hard at all   Food Insecurity: Not on file   Transportation Needs: No Transportation Needs (9/11/2023)    PRAPARE - Transportation    • Lack of Transportation (Medical): No    • Lack of Transportation (Non-Medical): No   Physical Activity: Not on file   Stress: Not on file   Social Connections: Not on file   Intimate Partner Violence: Not on file   Housing Stability: Not on file      Medications and Allergies:     No current outpatient medications on file. No current facility-administered medications for this visit.      Allergies   Allergen Reactions   • Sulfamethoxazole-Trimethoprim    • Penicillins Rash     "fast heart beat"      Immunizations: Immunization History   Administered Date(s) Administered   • Tdap 05/08/2010      Health Maintenance:         Topic Date Due   • Hepatitis C Screening  Never done   • Breast Cancer Screening: Mammogram  06/05/2024   • Colorectal Cancer Screening  08/08/2025         Topic Date Due   • COVID-19 Vaccine (1) Never done   • Pneumococcal Vaccine: 65+ Years (1 - PCV) Never done   • Influenza Vaccine (1) 09/01/2023      Medicare Screening Tests and Risk Assessments:     [facility] [place] is here for her Subsequent Wellness visit. Last Medicare Wellness visit information reviewed, patient interviewed and updates made to the record today. Health Risk Assessment:   Patient rates overall health as good. Patient feels that their physical health rating is same. Patient is very satisfied with their life. Eyesight was rated as same. Hearing was rated as same. Patient feels that their emotional and mental health rating is same. Patients states they are sometimes angry. Patient states they are sometimes unusually tired/fatigued. Pain experienced in the last 7 days has been some. Patient's pain rating has been 3/10. Patient states that she has experienced no weight loss or gain in last 6 months. Depression Screening:   PHQ-2 Score: 0      Fall Risk Screening: In the past year, patient has experienced: no history of falling in past year      Urinary Incontinence Screening:   Patient has not leaked urine accidently in the last six months. Home Safety:  Patient does not have trouble with stairs inside or outside of their home. Patient has working smoke alarms and has working carbon monoxide detector. Home safety hazards include: none. Nutrition:   Current diet is Regular. Medications:   Patient is currently taking over-the-counter supplements. OTC medications include: see medication list. Patient is able to manage medications.      Activities of Daily Living (ADLs)/Instrumental Activities of Daily Living (IADLs):   Walk and transfer into and out of bed and chair?: Yes  Dress and groom yourself?: Yes    Bathe or shower yourself?: Yes    Feed yourself? Yes  Do your laundry/housekeeping?: Yes  Manage your money, pay your bills and track your expenses?: Yes  Make your own meals?: Yes    Do your own shopping?: Yes    Previous Hospitalizations:   Any hospitalizations or ED visits within the last 12 months?: No      Advance Care Planning:   Living will: No    Durable POA for healthcare: No    Advanced directive: No    Advanced directive counseling given: Yes      Cognitive Screening:   Provider or family/friend/caregiver concerned regarding cognition?: No    PREVENTIVE SCREENINGS      Cardiovascular Screening:    General: Screening Current      Diabetes Screening:     General: Screening Current      Colorectal Cancer Screening:     General: Screening Current      Breast Cancer Screening:     General: Screening Current      Cervical Cancer Screening:    General: Screening Not Indicated      Osteoporosis Screening:    General: Risks and Benefits Discussed and Screening Current      Abdominal Aortic Aneurysm (AAA) Screening:        General: Risks and Benefits Discussed and Screening Not Indicated      Lung Cancer Screening:     General: Screening Not Indicated      Hepatitis C Screening:    General: Risks and Benefits Discussed    Hep C Screening Accepted: No     Screening, Brief Intervention, and Referral to Treatment (SBIRT)    Screening  Typical number of drinks in a day: 0  Typical number of drinks in a week: 0  Interpretation: Low risk drinking behavior. Single Item Drug Screening:  How often have you used an illegal drug (including marijuana) or a prescription medication for non-medical reasons in the past year? never    Single Item Drug Screen Score: 0  Interpretation: Negative screen for possible drug use disorder    Brief Intervention  Alcohol & drug use screenings were reviewed.  No concerns regarding substance use disorder identified. Annual Depression Screening  Time spent screening and evaluating the patient for depression during today's encounter was 5 minutes. Other Counseling Topics:   Car/seat belt/driving safety, skin self-exam, sunscreen and regular weightbearing exercise and calcium and vitamin D intake. No results found. Physical Exam:     /88 (BP Location: Left arm, Patient Position: Sitting, Cuff Size: Large)   Pulse 65   Temp (!) 96.6 °F (35.9 °C) (Tympanic)   Ht 4' 10" (1.473 m)   Wt 73.5 kg (162 lb)   SpO2 98%   BMI 33.86 kg/m²     Physical Exam  Vitals and nursing note reviewed. Constitutional:       General: She is not in acute distress. Appearance: Normal appearance. She is not ill-appearing, toxic-appearing or diaphoretic. HENT:      Nose: Nose normal.      Mouth/Throat:      Mouth: Mucous membranes are moist.      Pharynx: Oropharynx is clear. Eyes:      General:         Right eye: No discharge. Left eye: No discharge. Extraocular Movements: Extraocular movements intact. Conjunctiva/sclera: Conjunctivae normal.   Cardiovascular:      Rate and Rhythm: Normal rate and regular rhythm. Pulses: Normal pulses. Heart sounds: Normal heart sounds. Pulmonary:      Effort: Pulmonary effort is normal.      Breath sounds: Normal breath sounds. Musculoskeletal:      Cervical back: Normal range of motion and neck supple. No rigidity or tenderness. Right lower leg: No edema. Left lower leg: No edema. Lymphadenopathy:      Cervical: No cervical adenopathy. Skin:     General: Skin is warm and dry. Neurological:      General: No focal deficit present. Mental Status: She is alert and oriented to person, place, and time. Cranial Nerves: No cranial nerve deficit. Sensory: No sensory deficit. Motor: No weakness.       Coordination: Coordination normal.      Gait: Gait normal.      Deep Tendon Reflexes: Reflexes normal.   Psychiatric: Mood and Affect: Mood normal.         Behavior: Behavior normal.         Thought Content:  Thought content normal.         Judgment: Judgment normal.          Gabriel Pickering MD

## 2023-09-11 NOTE — PATIENT INSTRUCTIONS
Medicare Preventive Visit Patient Instructions  Thank you for completing your Welcome to Medicare Visit or Medicare Annual Wellness Visit today. Your next wellness visit will be due in one year (9/11/2024). The screening/preventive services that you may require over the next 5-10 years are detailed below. Some tests may not apply to you based off risk factors and/or age. Screening tests ordered at today's visit but not completed yet may show as past due. Also, please note that scanned in results may not display below. Preventive Screenings:  Service Recommendations Previous Testing/Comments   Colorectal Cancer Screening  * Colonoscopy    * Fecal Occult Blood Test (FOBT)/Fecal Immunochemical Test (FIT)  * Fecal DNA/Cologuard Test  * Flexible Sigmoidoscopy Age: 43-73 years old   Colonoscopy: every 10 years (may be performed more frequently if at higher risk)  OR  FOBT/FIT: every 1 year  OR  Cologuard: every 3 years  OR  Sigmoidoscopy: every 5 years  Screening may be recommended earlier than age 39 if at higher risk for colorectal cancer. Also, an individualized decision between you and your healthcare provider will decide whether screening between the ages of 77-80 would be appropriate. Colonoscopy: 08/08/2022  FOBT/FIT: Not on file  Cologuard: 08/08/2022  Sigmoidoscopy: Not on file    Screening Current     Breast Cancer Screening Age: 36 years old  Frequency: every 1-2 years  Not required if history of left and right mastectomy Mammogram: 06/05/2023    Screening Current   Cervical Cancer Screening Between the ages of 21-29, pap smear recommended once every 3 years. Between the ages of 32-69, can perform pap smear with HPV co-testing every 5 years.    Recommendations may differ for women with a history of total hysterectomy, cervical cancer, or abnormal pap smears in past. Pap Smear: Not on file    Screening Not Indicated   Hepatitis C Screening Once for adults born between 1945 and 1965  More frequently in patients at high risk for Hepatitis C Hep C Antibody: Not on file        Diabetes Screening 1-2 times per year if you're at risk for diabetes or have pre-diabetes Fasting glucose: 92 mg/dL (4/18/2023)  A1C: No results in last 5 years (No results in last 5 years)  Screening Current   Cholesterol Screening Once every 5 years if you don't have a lipid disorder. May order more often based on risk factors. Lipid panel: 08/09/2022    Screening Current     Other Preventive Screenings Covered by Medicare:  1. Abdominal Aortic Aneurysm (AAA) Screening: covered once if your at risk. You're considered to be at risk if you have a family history of AAA. 2. Lung Cancer Screening: covers low dose CT scan once per year if you meet all of the following conditions: (1) Age 48-67; (2) No signs or symptoms of lung cancer; (3) Current smoker or have quit smoking within the last 15 years; (4) You have a tobacco smoking history of at least 20 pack years (packs per day multiplied by number of years you smoked); (5) You get a written order from a healthcare provider. 3. Glaucoma Screening: covered annually if you're considered high risk: (1) You have diabetes OR (2) Family history of glaucoma OR (3)  aged 48 and older OR (3)  American aged 72 and older  3. Osteoporosis Screening: covered every 2 years if you meet one of the following conditions: (1) You're estrogen deficient and at risk for osteoporosis based off medical history and other findings; (2) Have a vertebral abnormality; (3) On glucocorticoid therapy for more than 3 months; (4) Have primary hyperparathyroidism; (5) On osteoporosis medications and need to assess response to drug therapy. · Last bone density test (DXA Scan): 08/18/2022.  5. HIV Screening: covered annually if you're between the age of 15-65. Also covered annually if you are younger than 13 and older than 72 with risk factors for HIV infection.  For pregnant patients, it is covered up to 3 times per pregnancy. Immunizations:  Immunization Recommendations   Influenza Vaccine Annual influenza vaccination during flu season is recommended for all persons aged >= 6 months who do not have contraindications   Pneumococcal Vaccine   * Pneumococcal conjugate vaccine = PCV13 (Prevnar 13), PCV15 (Vaxneuvance), PCV20 (Prevnar 20)  * Pneumococcal polysaccharide vaccine = PPSV23 (Pneumovax) Adults 20-63 years old: 1-3 doses may be recommended based on certain risk factors  Adults 72 years old: 1-2 doses may be recommended based off what pneumonia vaccine you previously received   Hepatitis B Vaccine 3 dose series if at intermediate or high risk (ex: diabetes, end stage renal disease, liver disease)   Tetanus (Td) Vaccine - COST NOT COVERED BY MEDICARE PART B Following completion of primary series, a booster dose should be given every 10 years to maintain immunity against tetanus. Td may also be given as tetanus wound prophylaxis. Tdap Vaccine - COST NOT COVERED BY MEDICARE PART B Recommended at least once for all adults. For pregnant patients, recommended with each pregnancy. Shingles Vaccine (Shingrix) - COST NOT COVERED BY MEDICARE PART B  2 shot series recommended in those aged 48 and above     Health Maintenance Due:      Topic Date Due   • Hepatitis C Screening  Never done   • Breast Cancer Screening: Mammogram  06/05/2024   • Colorectal Cancer Screening  08/08/2025     Immunizations Due:      Topic Date Due   • COVID-19 Vaccine (1) Never done   • Pneumococcal Vaccine: 65+ Years (1 - PCV) Never done   • Influenza Vaccine (1) 09/01/2023     Advance Directives   What are advance directives? Advance directives are legal documents that state your wishes and plans for medical care. These plans are made ahead of time in case you lose your ability to make decisions for yourself. Advance directives can apply to any medical decision, such as the treatments you want, and if you want to donate organs.    What are the types of advance directives? There are many types of advance directives, and each state has rules about how to use them. You may choose a combination of any of the following:  · Living will: This is a written record of the treatment you want. You can also choose which treatments you do not want, which to limit, and which to stop at a certain time. This includes surgery, medicine, IV fluid, and tube feedings. · Durable power of  for [facility]): This is a written record that states who you want to make healthcare choices for you when you are unable to make them for yourself. This person, called a proxy, is usually a family member or a friend. You may choose more than 1 proxy. · Do not resuscitate (DNR) order:  A DNR order is used in case your heart stops beating or you stop breathing. It is a request not to have certain forms of treatment, such as CPR. A DNR order may be included in other types of advance directives. · Medical directive: This covers the care that you want if you are in a coma, near death, or unable to make decisions for yourself. You can list the treatments you want for each condition. Treatment may include pain medicine, surgery, blood transfusions, dialysis, IV or tube feedings, and a ventilator (breathing machine). · Values history: This document has questions about your views, beliefs, and how you feel and think about life. This information can help others choose the care that you would choose. Why are advance directives important? An advance directive helps you control your care. Although spoken wishes may be used, it is better to have your wishes written down. Spoken wishes can be misunderstood, or not followed. Treatments may be given even if you do not want them. An advance directive may make it easier for your family to make difficult choices about your care.    Weight Management   Why it is important to manage your weight:  Being overweight increases your risk of health conditions such as heart disease, high blood pressure, type 2 diabetes, and certain types of cancer. It can also increase your risk for osteoarthritis, sleep apnea, and other respiratory problems. Aim for a slow, steady weight loss. Even a small amount of weight loss can lower your risk of health problems. How to lose weight safely:  A safe and healthy way to lose weight is to eat fewer calories and get regular exercise. You can lose up about 1 pound a week by decreasing the number of calories you eat by 500 calories each day. Healthy meal plan for weight management:  A healthy meal plan includes a variety of foods, contains fewer calories, and helps you stay healthy. A healthy meal plan includes the following:  · Eat whole-grain foods more often. A healthy meal plan should contain fiber. Fiber is the part of grains, fruits, and vegetables that is not broken down by your body. Whole-grain foods are healthy and provide extra fiber in your diet. Some examples of whole-grain foods are whole-wheat breads and pastas, oatmeal, brown rice, and bulgur. · Eat a variety of vegetables every day. Include dark, leafy greens such as spinach, kale, nicole greens, and mustard greens. Eat yellow and orange vegetables such as carrots, sweet potatoes, and winter squash. · Eat a variety of fruits every day. Choose fresh or canned fruit (canned in its own juice or light syrup) instead of juice. Fruit juice has very little or no fiber. · Eat low-fat dairy foods. Drink fat-free (skim) milk or 1% milk. Eat fat-free yogurt and low-fat cottage cheese. Try low-fat cheeses such as mozzarella and other reduced-fat cheeses. · Choose meat and other protein foods that are low in fat. Choose beans or other legumes such as split peas or lentils. Choose fish, skinless poultry (chicken or turkey), or lean cuts of red meat (beef or pork). Before you cook meat or poultry, cut off any visible fat. · Use less fat and oil.   Try baking foods instead of frying them. Add less fat, such as margarine, sour cream, regular salad dressing and mayonnaise to foods. Eat fewer high-fat foods. Some examples of high-fat foods include french fries, doughnuts, ice cream, and cakes. · Eat fewer sweets. Limit foods and drinks that are high in sugar. This includes candy, cookies, regular soda, and sweetened drinks. Exercise:  Exercise at least 30 minutes per day on most days of the week. Some examples of exercise include walking, biking, dancing, and swimming. You can also fit in more physical activity by taking the stairs instead of the elevator or parking farther away from stores. Ask your healthcare provider about the best exercise plan for you. © Copyright [employer] 2018 Information is for End User's use only and may not be sold, redistributed or otherwise used for commercial purposes.  All illustrations and images included in CareNotes® are the copyrighted property of [employer].D.A.M., Inc. or  [address]

## 2023-10-03 ENCOUNTER — TELEPHONE (OUTPATIENT)
Dept: SURGERY | Facility: CLINIC | Age: 73
End: 2023-10-03

## 2023-10-03 NOTE — TELEPHONE ENCOUNTER
Scheduled patient for 9/13 with [facility], called her to follow up. She left the appointment and was not seen. She wants to be put on the wait list for [facility] and not schedule elsewhere. Sending as an FYI.

## 2023-12-28 ENCOUNTER — HOSPITAL ENCOUNTER (OUTPATIENT)
Dept: ULTRASOUND IMAGING | Facility: HOSPITAL | Age: 73
End: 2023-12-28
Attending: SURGERY
Payer: MEDICARE

## 2023-12-28 DIAGNOSIS — D24.2: ICD-10-CM

## 2023-12-28 PROCEDURE — 76642 ULTRASOUND BREAST LIMITED: CPT

## 2024-01-03 ENCOUNTER — TELEPHONE (OUTPATIENT)
Dept: SURGERY | Facility: CLINIC | Age: 74
End: 2024-01-03

## 2024-01-03 NOTE — TELEPHONE ENCOUNTER
----- Message from Lalito Cervantes MD sent at 12/29/2023  2:37 PM EST -----  Please let the patient know that her imaging is stable without growth of the lesion which is great news. The imaging team would like to check things again in 6 months for close surveillance. She will need a bilateral diagnostic mammogram and a left breast diagnostic ultrasound in 6 months and an appointment to follow. Please confirm her appointment with us next week for a clinical exam and check in.

## 2024-01-17 ENCOUNTER — OFFICE VISIT (OUTPATIENT)
Dept: SURGERY | Facility: CLINIC | Age: 74
End: 2024-01-17
Payer: MEDICARE

## 2024-01-17 VITALS
WEIGHT: 165.2 LBS | RESPIRATION RATE: 16 BRPM | BODY MASS INDEX: 34.68 KG/M2 | HEART RATE: 78 BPM | TEMPERATURE: 97.5 F | DIASTOLIC BLOOD PRESSURE: 80 MMHG | OXYGEN SATURATION: 100 % | SYSTOLIC BLOOD PRESSURE: 138 MMHG | HEIGHT: 58 IN

## 2024-01-17 DIAGNOSIS — D24.2 INTRADUCTAL PAPILLOMA OF BREAST, LEFT: Primary | ICD-10-CM

## 2024-01-17 PROCEDURE — 99212 OFFICE O/P EST SF 10 MIN: CPT | Performed by: SURGERY

## 2024-01-17 RX ORDER — FLUOROURACIL 50 MG/G
CREAM TOPICAL
COMMUNITY
Start: 2023-11-21 | End: 2024-03-20

## 2024-01-17 NOTE — PATIENT INSTRUCTIONS
[facility]  Address: [address] [place] [address] [address], [place] [address]  Phone: [phone]

## 2024-01-18 NOTE — PROGRESS NOTES
Progress Note - Surgical Oncology  Jenny Gipson 73 y.o. 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 She is asymptomatic. The breast radiologist felt that the papilloma has some high risk imaging features including microlobulated margins, internal vascularity, heterogeneous appearance, not stable from prior appearance. These were reviewed with the patient. She understands that the lesion is benign and concordant but that some of the features above lead the radiologist to suggest surgical excisional biopsy of the site to rule out an upgrade to malignancy. She has a standard clip in place but the lesion would be amenable to [place]  localization. After discussing all options at our initial visit, we decided to follow the area closely before deciding on any surgical procedure on her breast.      Updated diagnostic ultrasound reviewed. The finding is still present but stable. There has been no progression, there are no clinical symptoms. Her exam is benign. We will continue to follow closely. We will see her in June after a bilateral diagnostic mammogram and left diagnostic ultrasound. She will contact me sooner if she develops any symptoms referable to her breasts and nipples.     She previously mentioned a right upper medial buttock lesion clinically consistent with a lipoma, she would like to monitor this for now, she is aware that we can excise that at any point but that it is entirely up to her.      We did help to arrange a dermatology skin check for her at her request as she has some skin findings on her upper back that she would like evaluated. She did attend two attempted visits with a local dermatologist but was not able to be seen either time. She would like to trial another local dermatologist in order to establish for  "skin checks, we will provide her with another name and contact number.   Subjective       Chief Complaint   Patient presents with    Follow-up      No new symptoms, updated US with stable papilloma. Was not able to complete the derm skin checks due to long waits and issues at the dermatology office, asking for another referral to a different office. Her sister is under our care for locally advanced breast cancer.       Review of Systems   Constitutional: Negative.    Skin:         Various moles and tags on chest and upper back, still pending skin check with derm   Hematological: Negative.    All other systems reviewed and are negative.      The following portions of the patient's history were reviewed and updated as appropriate: allergies, current medications, past family history, past medical history, past social history, past surgical history, and problem list.    Objective      Blood pressure 138/80, pulse 78, temperature 97.5 °F (36.4 °C), temperature source Temporal, resp. rate 16, height 4' 10\" (1.473 m), weight 74.9 kg (165 lb 3.2 oz), SpO2 100%, not currently breastfeeding.   Physical Exam  Vitals reviewed.   Constitutional:       General: She is not in acute distress.  HENT:      Head: Normocephalic.      Mouth/Throat:      Mouth: Mucous membranes are moist.   Eyes:      Pupils: Pupils are equal, round, and reactive to light.   Cardiovascular:      Rate and Rhythm: Normal rate.   Pulmonary:      Effort: Pulmonary effort is normal.   Abdominal:      Palpations: Abdomen is soft.   Musculoskeletal:         General: Normal range of motion.      Cervical back: Normal range of motion and neck supple.   Lymphadenopathy:      Cervical: No cervical adenopathy.   Skin:     General: Skin is warm and dry.      Capillary Refill: Capillary refill takes less than 2 seconds.   Neurological:      General: No focal deficit present.      Mental Status: She is alert.   Psychiatric:         Mood and Affect: Mood normal.     The " patient has no palpable cervical, supraclavicular, or axillary lymphadenopathy bilaterally.  The breasts are symmetric.  There are no dominant lumps, masses, skin changes or nipple retraction bilaterally.     Signature:  Lalito Cervantes MD  Date: 1/17/2024 Time: 8:40 PM

## 2024-04-17 ENCOUNTER — TELEPHONE (OUTPATIENT)
Dept: HEMATOLOGY ONCOLOGY | Facility: CLINIC | Age: 74
End: 2024-04-17

## 2024-04-17 NOTE — TELEPHONE ENCOUNTER
Appointment Change  Cancel, Reschedule, Change to Virtual      Who are you speaking with? Patient   If it is not the patient, is the caller listed on the communication consent form? Yes   Which provider is the appointment scheduled with? Dr. Trudi Rojas   When was the original appointment scheduled?    Please list date and time 4/30/24   At which location is the appointment scheduled to take place? [place]   Was the appointment rescheduled?     Was the appointment changed from an in person visit to a virtual visit?    If so, please list the details of the change. 4/23/24   What is the reason for the appointment change? provider

## 2024-04-17 NOTE — TELEPHONE ENCOUNTER
Patient calling to establish care with Gyn Onc. This is a self referral for a second opinion.  I completed intake questionnaire and sent to clinical review.  Patient is requesting to see Dr. Pedro.

## 2024-04-18 ENCOUNTER — TELEPHONE (OUTPATIENT)
Dept: HEMATOLOGY ONCOLOGY | Facility: CLINIC | Age: 74
End: 2024-04-18

## 2024-04-18 NOTE — TELEPHONE ENCOUNTER
Appointment Schedule   Who are you speaking with? Patient   If it is not the patient, are they listed on an active communication consent form? N/A   Which provider is the appointment scheduled with? Dr. Pedro   At which location is the appointment scheduled for? [place]   When is the appointment scheduled?  Please list date and time 4/29/24 1pm   What is the reason for this appointment? consult   Did patient voice understanding of the details of this appointment? Yes   Was the no show policy reviewed with patient? Yes

## 2024-04-22 ENCOUNTER — APPOINTMENT (OUTPATIENT)
Dept: LAB | Facility: CLINIC | Age: 74
End: 2024-04-22
Payer: MEDICARE

## 2024-04-22 DIAGNOSIS — R79.89 ELEVATED FERRITIN: ICD-10-CM

## 2024-04-22 DIAGNOSIS — E53.8 B12 DEFICIENCY: ICD-10-CM

## 2024-04-22 DIAGNOSIS — R71.8 ELEVATED HEMATOCRIT: ICD-10-CM

## 2024-04-22 LAB
BASOPHILS # BLD AUTO: 0.03 THOUSANDS/ÂΜL (ref 0–0.1)
BASOPHILS NFR BLD AUTO: 1 % (ref 0–1)
EOSINOPHIL # BLD AUTO: 0.18 THOUSAND/ÂΜL (ref 0–0.61)
EOSINOPHIL NFR BLD AUTO: 3 % (ref 0–6)
ERYTHROCYTE [DISTWIDTH] IN BLOOD BY AUTOMATED COUNT: 13 % (ref 11.6–15.1)
ERYTHROCYTE [SEDIMENTATION RATE] IN BLOOD: 24 MM/HOUR (ref 0–29)
FERRITIN SERPL-MCNC: 342 NG/ML (ref 11–307)
HCT VFR BLD AUTO: 46.8 % (ref 34.8–46.1)
HGB BLD-MCNC: 14.8 G/DL (ref 11.5–15.4)
IMM GRANULOCYTES # BLD AUTO: 0.02 THOUSAND/UL (ref 0–0.2)
IMM GRANULOCYTES NFR BLD AUTO: 0 % (ref 0–2)
LYMPHOCYTES # BLD AUTO: 1.01 THOUSANDS/ÂΜL (ref 0.6–4.47)
LYMPHOCYTES NFR BLD AUTO: 17 % (ref 14–44)
MCH RBC QN AUTO: 30.1 PG (ref 26.8–34.3)
MCHC RBC AUTO-ENTMCNC: 31.6 G/DL (ref 31.4–37.4)
MCV RBC AUTO: 95 FL (ref 82–98)
MONOCYTES # BLD AUTO: 0.43 THOUSAND/ÂΜL (ref 0.17–1.22)
MONOCYTES NFR BLD AUTO: 7 % (ref 4–12)
NEUTROPHILS # BLD AUTO: 4.28 THOUSANDS/ÂΜL (ref 1.85–7.62)
NEUTS SEG NFR BLD AUTO: 72 % (ref 43–75)
NRBC BLD AUTO-RTO: 0 /100 WBCS
PLATELET # BLD AUTO: 229 THOUSANDS/UL (ref 149–390)
PMV BLD AUTO: 9.6 FL (ref 8.9–12.7)
RBC # BLD AUTO: 4.91 MILLION/UL (ref 3.81–5.12)
VIT B12 SERPL-MCNC: 272 PG/ML (ref 180–914)
WBC # BLD AUTO: 5.95 THOUSAND/UL (ref 4.31–10.16)

## 2024-04-22 PROCEDURE — 82728 ASSAY OF FERRITIN: CPT

## 2024-04-22 PROCEDURE — 83550 IRON BINDING TEST: CPT

## 2024-04-22 PROCEDURE — 82668 ASSAY OF ERYTHROPOIETIN: CPT

## 2024-04-22 PROCEDURE — 82607 VITAMIN B-12: CPT

## 2024-04-22 PROCEDURE — 85652 RBC SED RATE AUTOMATED: CPT

## 2024-04-22 PROCEDURE — 83540 ASSAY OF IRON: CPT

## 2024-04-22 PROCEDURE — 80053 COMPREHEN METABOLIC PANEL: CPT

## 2024-04-22 PROCEDURE — 85025 COMPLETE CBC W/AUTO DIFF WBC: CPT

## 2024-04-22 PROCEDURE — 86140 C-REACTIVE PROTEIN: CPT

## 2024-04-22 PROCEDURE — 36415 COLL VENOUS BLD VENIPUNCTURE: CPT

## 2024-04-23 ENCOUNTER — OFFICE VISIT (OUTPATIENT)
Dept: HEMATOLOGY ONCOLOGY | Facility: CLINIC | Age: 74
End: 2024-04-23
Payer: MEDICARE

## 2024-04-23 VITALS
RESPIRATION RATE: 18 BRPM | HEIGHT: 58 IN | SYSTOLIC BLOOD PRESSURE: 128 MMHG | OXYGEN SATURATION: 97 % | TEMPERATURE: 97.9 F | HEART RATE: 67 BPM | WEIGHT: 163 LBS | DIASTOLIC BLOOD PRESSURE: 80 MMHG | BODY MASS INDEX: 34.22 KG/M2

## 2024-04-23 DIAGNOSIS — R71.8 ELEVATED HEMATOCRIT: Primary | ICD-10-CM

## 2024-04-23 DIAGNOSIS — E53.8 B12 DEFICIENCY: ICD-10-CM

## 2024-04-23 DIAGNOSIS — R79.89 ELEVATED FERRITIN: ICD-10-CM

## 2024-04-23 DIAGNOSIS — E53.8 VITAMIN B12 DEFICIENCY: ICD-10-CM

## 2024-04-23 LAB
ALBUMIN SERPL BCP-MCNC: 4.1 G/DL (ref 3.5–5)
ALP SERPL-CCNC: 83 U/L (ref 34–104)
ALT SERPL W P-5'-P-CCNC: 17 U/L (ref 7–52)
ANION GAP SERPL CALCULATED.3IONS-SCNC: 6 MMOL/L (ref 4–13)
AST SERPL W P-5'-P-CCNC: 20 U/L (ref 13–39)
BILIRUB SERPL-MCNC: 0.96 MG/DL (ref 0.2–1)
BUN SERPL-MCNC: 11 MG/DL (ref 5–25)
CALCIUM SERPL-MCNC: 9.4 MG/DL (ref 8.4–10.2)
CHLORIDE SERPL-SCNC: 108 MMOL/L (ref 96–108)
CO2 SERPL-SCNC: 28 MMOL/L (ref 21–32)
CREAT SERPL-MCNC: 0.76 MG/DL (ref 0.6–1.3)
CRP SERPL QL: 1.1 MG/L
EPO SERPL-ACNC: 8.7 MIU/ML (ref 2.6–18.5)
GFR SERPL CREATININE-BSD FRML MDRD: 79 ML/MIN/1.73SQ M
GLUCOSE P FAST SERPL-MCNC: 104 MG/DL (ref 65–99)
IRON SATN MFR SERPL: 42 % (ref 15–50)
IRON SERPL-MCNC: 141 UG/DL (ref 50–212)
POTASSIUM SERPL-SCNC: 4.3 MMOL/L (ref 3.5–5.3)
PROT SERPL-MCNC: 6.7 G/DL (ref 6.4–8.4)
SODIUM SERPL-SCNC: 142 MMOL/L (ref 135–147)
TIBC SERPL-MCNC: 334 UG/DL (ref 250–450)
UIBC SERPL-MCNC: 193 UG/DL (ref 155–355)

## 2024-04-23 PROCEDURE — G2211 COMPLEX E/M VISIT ADD ON: HCPCS | Performed by: INTERNAL MEDICINE

## 2024-04-23 PROCEDURE — 99214 OFFICE O/P EST MOD 30 MIN: CPT | Performed by: INTERNAL MEDICINE

## 2024-04-23 NOTE — PROGRESS NOTES
Hematology/Oncology Outpatient Follow-up  Jenny Gipson 73 y.o. female 1950 [id number]    Date:  4/23/2024    Assessment and Plan:  1. Elevated hematocrit  The patient seems to have high normal hemoglobin hematocrit which was worked up extensively.  This is most likely reactive in nature.  JAK2 mutation was negative in the past which rules out polycythemia vera.  - CBC and differential; Future  - Comprehensive metabolic panel; Future  - Ferritin; Future  - C-reactive protein; Future  - LD,Blood; Future  - Magnesium; Future  - Vitamin B12; Future  - Sedimentation rate, automated; Future    2. Elevated ferritin  She continues to have persistent elevation of the ferritin level above 300.  The iron saturation is less than 45% which goes against iron overload.  Will continue to monitor closely.  - Ferritin; Future  - Iron Panel (Includes Ferritin, Iron Sat%, Iron, and TIBC); Future    3. B12 deficiency  She was found to be vitamin B12 deficient.  I did ask her to consider taking vitamin B12 supplements frequently.  - Sedimentation rate, automated; Future          HPI:  Patient is a 73-year-old female who originally presents for further evaluation of her elevated hematocrit/RBCs.  She has no significant past medical history other than abnormal Pap smear status post hysterectomy; is being monitored by gynecology oncology.     She has never smoked and denies any secondhand smoke exposure.  Denied any constitutional symptoms.  Denied early satiety, rash, headaches, dizziness or aquagenic pruritus.  Denies any history of lung disease.  She does admits that she was told in the past that she snores on occasion but denies fatigue/daytime sleepiness or any episodes of awakening gasping for air/choking.  Patient states that she is very active and does not like to sit still.  She still works 1 day a week part-time and otherwise is staying active running errands or doing things around the house.  She does consume a significant  amount of coffee on a daily basis and admits she does not drink a lot of water during the day 1-2, 8 oz bottles if that.     Her laboratory studies from 08/09/2022 showed normal WBC 5.64, RBC slightly above average 5.17, hemoglobin high normal 15.1, hematocrit mildly elevated 48.1%, MCV normal 93 platelet count normal 230. Total bili slightly above average 1.03 otherwise normal CMP.  She does not have a lot of prior laboratory studies available for review, but did note mild elevation of her H&H/RBC on her CBC from September 2014.     Her most recent laboratory studies from 11/09/2022 showed normal white cells and platelets, RBC is normal 4.91, hemoglobin normal 15 her hematocrit continues to be higher than average 48.3%, MCV 98.  Glucose 105, total bili 1.26 remaining metabolic panel is normal.  Her inflammatory markers are not elevated.  Erythropoietin 6.8.  Carboxyhemoglobin level normal 1.4%.  LDH is normal 143.  Her B12 is lower than average 307.  Folic acid is appropriate.  Iron panel showed normal iron saturation 30%, TIBC 330, serum iron 125 with ferritin elevated 450.  Reflexive JAK2 mutational studies were ordered unfortunately the lab only checked JAK2 exons 12-15 which came back negative.     Additional work-up 11/09/2022 showed normal white cells and platelets, RBC is normal 4.91, hemoglobin normal 15 her hematocrit continues to be higher than average 48.3%, MCV 98.  Glucose 105, total bili 1.26 remaining metabolic panel is normal.  Her inflammatory markers are not elevated.  Erythropoietin 6.8. Carboxyhemoglobin level normal 1.4%.  LDH is normal 143.  Her B12 is lower than average 307.  Folic acid is appropriate.  Iron panel showed normal iron saturation 30%, TIBC 330, serum iron 125 with ferritin elevated 450.  Reflexive JAK2 mutational studies were ordered unfortunately the lab only checked JAK2 exons 12-15 which came back negative.  She then had JAK2 V617F test 4/2023 which came back  negative.          Interval history:  The patient came there for follow-up visit.  She had blood work on 4/22/2024 which showed hemoglobin of 14.8 with normal white cells and platelets.  White cell differential was normal.  Creatinine 0.7 with normal calcium and liver enzymes.  Inflammation markers are normal.  Vitamin B12 272.  Erythropoietin 8.7.  Iron panel showed saturation of 42% with ferritin of 342.        ROS: Review of Systems   Constitutional:  Negative for chills and fever.   HENT:  Negative for ear pain and sore throat.    Eyes:  Negative for pain and visual disturbance.   Respiratory:  Negative for cough and shortness of breath.    Cardiovascular:  Negative for chest pain and palpitations.   Gastrointestinal:  Negative for abdominal pain and vomiting.   Genitourinary:  Negative for dysuria and hematuria.   Musculoskeletal:  Negative for arthralgias and back pain.   Skin:  Negative for color change and rash.   Neurological:  Positive for dizziness and numbness. Negative for seizures and syncope.   Psychiatric/Behavioral:  Positive for sleep disturbance.    All other systems reviewed and are negative.      History reviewed. No pertinent past medical history.    Past Surgical History:   Procedure Laterality Date    APPENDECTOMY      BREAST BIOPSY Left 02/14/2023    intraductal papiloma    HYSTERECTOMY      US GUIDED BREAST BIOPSY LEFT COMPLETE Left 02/14/2023       Social History     Socioeconomic History    Marital status: Single     Spouse name: None    Number of children: None    Years of education: None    Highest education level: None   Occupational History    None   Tobacco Use    Smoking status: Never    Smokeless tobacco: Never   Vaping Use    Vaping status: Never Used   Substance and Sexual Activity    Alcohol use: Never    Drug use: Never    Sexual activity: Not Currently     Partners: Male   Other Topics Concern    None   Social History Narrative    None     Social Determinants of Health  "    Financial Resource Strain: Low Risk  (9/11/2023)    Overall Financial Resource Strain (CARDIA)     Difficulty of Paying Living Expenses: Not hard at all   Food Insecurity: Not on file   Transportation Needs: No Transportation Needs (9/11/2023)    PRAPARE - Transportation     Lack of Transportation (Medical): No     Lack of Transportation (Non-Medical): No   Physical Activity: Not on file   Stress: Not on file   Social Connections: Not on file   Intimate Partner Violence: Not on file   Housing Stability: Not on file       Family History   Problem Relation Age of Onset    No Known Problems Mother     No Known Problems Father     Breast cancer Sister 69       Allergies   Allergen Reactions    Sulfamethoxazole-Trimethoprim     Penicillins Rash     \"fast heart beat\"         Current Outpatient Medications:     fluorouracil (EFUDEX) 5 % cream, Apply topically (Patient not taking: Reported on 1/17/2024), Disp: , Rfl:       Physical Exam:  /80 (BP Location: Right arm, Patient Position: Sitting, Cuff Size: Adult)   Pulse 67   Temp 97.9 °F (36.6 °C)   Resp 18   Ht 4' 10\" (1.473 m)   Wt 73.9 kg (163 lb)   SpO2 97%   BMI 34.07 kg/m²     Physical Exam  Constitutional:       General: She is not in acute distress.     Appearance: She is well-developed. She is not diaphoretic.   HENT:      Head: Normocephalic and atraumatic.      Nose: Nose normal.   Eyes:      General: No scleral icterus.        Right eye: No discharge.         Left eye: No discharge.      Conjunctiva/sclera: Conjunctivae normal.      Pupils: Pupils are equal, round, and reactive to light.   Neck:      Thyroid: No thyromegaly.      Vascular: No JVD.      Trachea: No tracheal deviation.   Cardiovascular:      Rate and Rhythm: Normal rate and regular rhythm.      Heart sounds: Normal heart sounds. No murmur heard.     No friction rub.   Pulmonary:      Effort: Pulmonary effort is normal. No respiratory distress.      Breath sounds: Normal breath " sounds. No stridor. No wheezing or rales.   Chest:      Chest wall: No tenderness.   Abdominal:      General: There is no distension.      Palpations: Abdomen is soft. There is no hepatomegaly or splenomegaly.      Tenderness: There is no abdominal tenderness. There is no guarding or rebound.   Musculoskeletal:         General: No tenderness or deformity. Normal range of motion.      Cervical back: Normal range of motion and neck supple.   Lymphadenopathy:      Cervical: No cervical adenopathy.   Skin:     General: Skin is warm and dry.      Coloration: Skin is not pale.      Findings: No erythema or rash.   Neurological:      Mental Status: She is alert and oriented to person, place, and time.      Cranial Nerves: No cranial nerve deficit.      Coordination: Coordination normal.      Deep Tendon Reflexes: Reflexes are normal and symmetric.   Psychiatric:         Behavior: Behavior normal.         Thought Content: Thought content normal.         Judgment: Judgment normal.           Labs:  Lab Results   Component Value Date    WBC 5.95 04/22/2024    HGB 14.8 04/22/2024    HCT 46.8 (H) 04/22/2024    MCV 95 04/22/2024     04/22/2024     Lab Results   Component Value Date    K 4.3 04/22/2024     04/22/2024    CO2 28 04/22/2024    BUN 11 04/22/2024    CREATININE 0.76 04/22/2024    GLUF 104 (H) 04/22/2024    CALCIUM 9.4 04/22/2024    AST 20 04/22/2024    ALT 17 04/22/2024    ALKPHOS 83 04/22/2024    EGFR 79 04/22/2024       Patient voiced understanding and agreement in the above discussion. Aware to contact our office with questions/symptoms in the interim.

## 2024-04-24 ENCOUNTER — TELEPHONE (OUTPATIENT)
Dept: GYNECOLOGIC ONCOLOGY | Facility: CLINIC | Age: 74
End: 2024-04-24

## 2024-04-24 NOTE — TELEPHONE ENCOUNTER
Called and inform Patient Provider needed to move patient to 5/7/2024 in [place] at 1 pm with Dr. Pedro as per JS.

## 2024-04-29 ENCOUNTER — TELEPHONE (OUTPATIENT)
Dept: HEMATOLOGY ONCOLOGY | Facility: CLINIC | Age: 74
End: 2024-04-29

## 2024-04-29 NOTE — TELEPHONE ENCOUNTER
Appointment Confirmation   Who are you speaking with? Patient   If it is not the patient, are they listed on an active communication consent form? N/A   Which provider is the appointment scheduled with?  Dr. Pedro   When is the appointment scheduled?  Please list date and time 5/7/24 1:00pm   At which location is the appointment scheduled to take place? [place]   Did caller verbalize understanding of appointment details? Yes

## 2024-05-07 ENCOUNTER — OFFICE VISIT (OUTPATIENT)
Dept: GYNECOLOGIC ONCOLOGY | Facility: CLINIC | Age: 74
End: 2024-05-07
Payer: MEDICARE

## 2024-05-07 VITALS
HEIGHT: 58 IN | TEMPERATURE: 97.6 F | HEART RATE: 83 BPM | WEIGHT: 164.6 LBS | OXYGEN SATURATION: 98 % | SYSTOLIC BLOOD PRESSURE: 138 MMHG | DIASTOLIC BLOOD PRESSURE: 64 MMHG | BODY MASS INDEX: 34.55 KG/M2

## 2024-05-07 DIAGNOSIS — D07.2 VAIN III (VAGINAL INTRAEPITHELIAL NEOPLASIA III): Primary | ICD-10-CM

## 2024-05-07 DIAGNOSIS — D07.1 VULVAR INTRAEPITHELIAL NEOPLASIA (VIN) GRADE 3: ICD-10-CM

## 2024-05-07 PROCEDURE — 99204 OFFICE O/P NEW MOD 45 MIN: CPT | Performed by: OBSTETRICS & GYNECOLOGY

## 2024-05-07 PROCEDURE — 87624 HPV HI-RISK TYP POOLED RSLT: CPT | Performed by: OBSTETRICS & GYNECOLOGY

## 2024-05-07 PROCEDURE — 57421 EXAM/BIOPSY OF VAG W/SCOPE: CPT | Performed by: OBSTETRICS & GYNECOLOGY

## 2024-05-07 PROCEDURE — 88344 IMHCHEM/IMCYTCHM EA MLT ANTB: CPT | Performed by: SPECIALIST

## 2024-05-07 PROCEDURE — 88305 TISSUE EXAM BY PATHOLOGIST: CPT | Performed by: SPECIALIST

## 2024-05-07 PROCEDURE — 88175 CYTOPATH C/V AUTO FLUID REDO: CPT | Performed by: PATHOLOGY

## 2024-05-07 NOTE — ASSESSMENT & PLAN NOTE
Patient has a history of cervical dysplasia treated with hysterectomy followed by recurrence as vaginal dysplasia treated twice with laser ablation.  Last was in 2017.    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  Colposcopy is outside have been normal.  Colposcopy today was indicative of recurrence of the vaginal cuff.  The rest of the vagina was unremarkable.    Colposcopy and biopsy were performed Pap smear was performed.  The patient will follow-up in 2 weeks and biopsy results and consideration of laser ablation.

## 2024-05-07 NOTE — PROGRESS NOTES
assessment/Plan:    VAIN III (vaginal intraepithelial neoplasia III)                 Patient has a history of cervical dysplasia treated with hysterectomy followed by recurrence as vaginal dysplasia treated twice with laser ablation.  Last was in 2017.     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  Colposcopy is outside have been normal.  Colposcopy today was indicative of recurrence of the vaginal cuff.  The rest of the vagina was unremarkable.     Colposcopy and biopsy were performed Pap smear was performed.  The patient will follow-up in 2 weeks and biopsy results and consideration of laser ablation.                CHIEF COMPLAINT: Persistent VA IN 3        Patient ID: Jenny Gipson is a 73 y.o. female  NISSA Gipson is a 73 y.o. female  with a diagnosis of VAIN 3 who presents today for consult.    S/p H BSO in   Long standing h/o vaginal dysplasia and HPV16+  S/p laser ablation 2017    Pap smear 2022 ASCH-H, HPV16+  Pap smear 23 HGSIL, HPV16+    Pap smear 23 HGSIL, HPV16+  Colposcopy stable generalized atrophic changes, stable scar tissue flush with vaginal apex, no aceto white changes, no lesions amendable to biopsy.   Prescribed vaginal efudex 2023.  The patient chose not to take the Efudex as it was expressly stated on the prescription not to take this vaginally.    The patient therefore has had no treatment.    Today, the patient is doing well.  She denies significant abdominal pain, pelvic pain, nausea, vomiting, constipation, diarrhea, fevers, chills, or vaginal bleeding.          Review of Systems   Constitutional: Negative.    HENT: Negative.     Eyes: Negative.    Respiratory: Negative.     Cardiovascular: Negative.    Gastrointestinal: Negative.    Endocrine: Negative.    Genitourinary: Negative.    Musculoskeletal: Negative.    Skin: Negative.    Neurological: Negative.    Hematological: Negative.    Psychiatric/Behavioral:  "Negative.         Current Outpatient Medications   Medication Sig Dispense Refill    fluorouracil (EFUDEX) 5 % cream Apply topically (Patient not taking: Reported on 2024)       No current facility-administered medications for this visit.       Allergies   Allergen Reactions    Sulfamethoxazole-Trimethoprim     Penicillins Rash     \"fast heart beat\"       History reviewed. No pertinent past medical history.    Past Surgical History:   Procedure Laterality Date    APPENDECTOMY      BREAST BIOPSY Left 2023    intraductal papiloma    HYSTERECTOMY      US GUIDED BREAST BIOPSY LEFT COMPLETE Left 2023       OB History          1    Para   1    Term   1            AB        Living             SAB        IAB        Ectopic        Multiple        Live Births   1                 Family History   Problem Relation Age of Onset    No Known Problems Mother     No Known Problems Father     Breast cancer Sister 69       The following portions of the patient's history were reviewed and updated as appropriate: allergies, current medications, past family history, past medical history, past social history, past surgical history, and problem list.      Objective:    Blood pressure 138/64, pulse 83, temperature 97.6 °F (36.4 °C), height 4' 10\" (1.473 m), weight 74.7 kg (164 lb 9.6 oz), SpO2 98%, not currently breastfeeding.  Body mass index is 34.4 kg/m².    Physical Exam  Constitutional:       Appearance: She is well-developed.   HENT:      Head: Normocephalic and atraumatic.   Neck:      Thyroid: No thyromegaly.   Cardiovascular:      Rate and Rhythm: Normal rate and regular rhythm.      Heart sounds: Normal heart sounds.   Pulmonary:      Effort: Pulmonary effort is normal.      Breath sounds: Normal breath sounds.   Abdominal:      General: Bowel sounds are normal.      Palpations: Abdomen is soft.      Comments: Well healed laparoscopic incisions.   Genitourinary:     Comments: -Normal external female " "genitalia, normal Barth[place]'s and [place]'s glands                  -Normal midline urethral meatus. No lesions notes                  -Bladder without fullness mass or tenderness                  -Vagina without lesion or discharge No significant cystocele or rectocele noted                  -Cervix surgically absent                  -Uterus surgically absent                  -Adnexae surgically absent                  - Anus without fissure of lesion    Musculoskeletal:         General: Normal range of motion.      Cervical back: Normal range of motion and neck supple.   Lymphadenopathy:      Cervical: No cervical adenopathy.   Skin:     General: Skin is warm and dry.   Neurological:      Mental Status: She is alert and oriented to person, place, and time.   Psychiatric:         Behavior: Behavior normal.           No results found for: \"\"  Lab Results   Component Value Date    WBC 5.95 04/22/2024    HGB 14.8 04/22/2024    HCT 46.8 (H) 04/22/2024    MCV 95 04/22/2024     04/22/2024     Lab Results   Component Value Date    K 4.3 04/22/2024     04/22/2024    CO2 28 04/22/2024    BUN 11 04/22/2024    CREATININE 0.76 04/22/2024    GLUF 104 (H) 04/22/2024    CALCIUM 9.4 04/22/2024    AST 20 04/22/2024    ALT 17 04/22/2024    ALKPHOS 83 04/22/2024    EGFR 79 04/22/2024        Colposcopy     Date/Time  5/7/2024 1:00 PM     Universal Protocol   Consent: Verbal consent obtained.  Patient understanding: patient states understanding of the procedure being performed  Patient identity confirmed: verbally with patient     Performed by  Rogelio Pedro MD   Authorized by  Rgoelio Pedro MD     Pre-procedure details      Prepped with: acetic acid     Indication    HSIL   Procedure Details   Procedure: Colposcopy of vagina w/ biopsy      Under satisfactory analgesia the patient was prepped and draped in the dorsal lithotomy position: yes      [place] speculum was placed in the vagina: yes      Under " colposcopic examination the transition zone was seen in entirety: no      Intracervical block was performed: no      Monsel's solution was applied: no      Biopsy(s): yes      Location:  12:00 midline vaginal cuff    Specimen to pathology: yes     Post-procedure      Findings: Mosaicism and White epithelium      Impression: High grade cervical dysplasia     Comments       After verbal informed consent colposcopy of the vagina, acetowhite change was noted at the 12:00 area approximately 5 mm above the vaginal cuff in the midline.  This was biopsied without difficulty.  Hemostasis was assured with silver nitrate.

## 2024-05-09 LAB
HPV HR 12 DNA CVX QL NAA+PROBE: POSITIVE
HPV16 DNA CVX QL NAA+PROBE: POSITIVE
HPV18 DNA CVX QL NAA+PROBE: NEGATIVE

## 2024-05-14 PROCEDURE — 88305 TISSUE EXAM BY PATHOLOGIST: CPT | Performed by: SPECIALIST

## 2024-05-14 PROCEDURE — 88344 IMHCHEM/IMCYTCHM EA MLT ANTB: CPT | Performed by: SPECIALIST

## 2024-05-15 LAB
LAB AP GYN PRIMARY INTERPRETATION: ABNORMAL
Lab: ABNORMAL
PATH INTERP SPEC-IMP: ABNORMAL

## 2024-05-15 PROCEDURE — 88141 CYTOPATH C/V INTERPRET: CPT | Performed by: PATHOLOGY

## 2024-05-21 ENCOUNTER — OFFICE VISIT (OUTPATIENT)
Dept: GYNECOLOGIC ONCOLOGY | Facility: CLINIC | Age: 74
End: 2024-05-21
Payer: MEDICARE

## 2024-05-21 VITALS
DIASTOLIC BLOOD PRESSURE: 74 MMHG | SYSTOLIC BLOOD PRESSURE: 130 MMHG | TEMPERATURE: 97.3 F | RESPIRATION RATE: 18 BRPM | HEART RATE: 76 BPM | WEIGHT: 164.6 LBS | OXYGEN SATURATION: 99 % | HEIGHT: 58 IN | BODY MASS INDEX: 34.55 KG/M2

## 2024-05-21 DIAGNOSIS — D07.2 VAIN III (VAGINAL INTRAEPITHELIAL NEOPLASIA III): Primary | ICD-10-CM

## 2024-05-21 PROCEDURE — 88305 TISSUE EXAM BY PATHOLOGIST: CPT | Performed by: STUDENT IN AN ORGANIZED HEALTH CARE EDUCATION/TRAINING PROGRAM

## 2024-05-21 PROCEDURE — 57421 EXAM/BIOPSY OF VAG W/SCOPE: CPT | Performed by: OBSTETRICS & GYNECOLOGY

## 2024-05-21 PROCEDURE — 99213 OFFICE O/P EST LOW 20 MIN: CPT | Performed by: OBSTETRICS & GYNECOLOGY

## 2024-05-21 NOTE — ASSESSMENT & PLAN NOTE
Patient has high-grade BÁRBARA of upper vagina.  Colposcopy findings are consistent with high-grade BÁRBARA however prior biopsy did not identify this.  Repeat colposcopy and biopsy was performed today.  Unfortunately the biopsy instrument was not sharp enough to get through tissue.  Small fragments of tissue were sent to pathology.    We have discussed treatment options of Efudex Aldara suppository or laser ablation.  Given the side effect of Efudex and Aldara suppository I would prefer laser ablation.  The patient has undergone this in the past and resulted in several years of control.    The lesions have always been at the upper vagina around the cuff.  We would consider laser ablation of this area in future especially if biopsies are positive, but would also consider in light of insufficient biopsy.    Patient will return in 2 weeks for biopsy review.

## 2024-05-21 NOTE — PROGRESS NOTES
Assessment/Plan:    Problem List Items Addressed This Visit       VAIN III (vaginal intraepithelial neoplasia III) - Primary     Patient has high-grade BÁRBARA of upper vagina.  Colposcopy findings are consistent with high-grade BÁRBARA however prior biopsy did not identify this.  Repeat colposcopy and biopsy was performed today.  Unfortunately the biopsy instrument was not sharp enough to get through tissue.  Small fragments of tissue were sent to pathology.    We have discussed treatment options of Efudex Aldara suppository or laser ablation.  Given the side effect of Efudex and Aldara suppository I would prefer laser ablation.  The patient has undergone this in the past and resulted in several years of control.    The lesions have always been at the upper vagina around the cuff.  We would consider laser ablation of this area in future especially if biopsies are positive, but would also consider in light of insufficient biopsy.    Patient will return in 2 weeks for biopsy review.              CHIEF COMPLAINT: High-grade BÁRBARA vagina        Patient ID: Jenny Gipson is a 73 y.o. female  Patient is very pleasant 73-year-old female with a history of high-grade BÁRBARA of the genital tract.  She is undergone a hysterectomy in the past.  She has had high-grade BÁRBARA of the vagina in the past that was treated with laser ablation.  Twice, the last in 2017.  Patient has recently developed high-grade BÁRBARA of the upper vagina again.  [facility].  She underwent Pap smear and colposcopy last week.  Colposcopy indicated acetowhite lesion in the mid vaginal cuff which was biopsied.  Pap smear was also performed.    PAap smear revealed high-grade BÁRBARA.  Colposcopic biopsy was unremarkable.  HPV 16 was noted noted to be positive.    . Vaginal, 12:  - Atrophic and reactive squamous mucosa with acute and chronic inflammation.   - HPV related changes.   - Negative for high grade dysplasia.     "  P16/KI67 multiplex immunohistochemical stain performed with adequate control on block A1 is negative for p16 with no increase in Ki67 staining, supporting the diagnosis.     Today, the patient is doing well.  She denies significant abdominal pain, pelvic pain, nausea, vomiting, constipation, diarrhea, fevers, chills, or vaginal bleeding.             The following portions of the patient's history were reviewed and updated as appropriate: allergies, current medications, past family history, past medical history, past social history, past surgical history, and problem list.    Review of Systems   Constitutional: Negative.    HENT: Negative.     Eyes: Negative.    Respiratory: Negative.     Cardiovascular: Negative.    Gastrointestinal: Negative.    Endocrine: Negative.    Genitourinary: Negative.    Musculoskeletal: Negative.    Skin: Negative.    Neurological: Negative.    Hematological: Negative.    Psychiatric/Behavioral: Negative.         Current Outpatient Medications   Medication Sig Dispense Refill    fluorouracil (EFUDEX) 5 % cream Apply topically (Patient not taking: Reported on 1/17/2024)       No current facility-administered medications for this visit.           Objective:    Blood pressure 130/74, pulse 76, temperature (!) 97.3 °F (36.3 °C), temperature source Temporal, resp. rate 18, height 4' 10\" (1.473 m), weight 74.7 kg (164 lb 9.6 oz), SpO2 99%, not currently breastfeeding.  Body mass index is 34.4 kg/m².  Body surface area is 1.68 meters squared.    Physical Exam  Constitutional:       Appearance: She is well-developed.   HENT:      Head: Normocephalic and atraumatic.   Neck:      Thyroid: No thyromegaly.   Cardiovascular:      Rate and Rhythm: Normal rate and regular rhythm.      Heart sounds: Normal heart sounds.   Pulmonary:      Effort: Pulmonary effort is normal.      Breath sounds: Normal breath sounds.   Abdominal:      General: Bowel sounds are normal.      Palpations: Abdomen is soft.     " " Comments: Well healed laparoscopic incisions.   Genitourinary:     Comments: -Normal external female genitalia, normal Bartholin's and [place]'s glands                  -Normal midline urethral meatus. No lesions notes                  -Bladder without fullness mass or tenderness                  -Vagina without lesion or discharge No significant cystocele or rectocele noted                  -Cervix surgically absent                  -Uterus surgically absent                  -Adnexae surgically absent                  - Anus without fissure of lesion    Musculoskeletal:         General: Normal range of motion.      Cervical back: Normal range of motion and neck supple.   Lymphadenopathy:      Cervical: No cervical adenopathy.   Skin:     General: Skin is warm and dry.   Neurological:      Mental Status: She is alert and oriented to person, place, and time.   Psychiatric:         Behavior: Behavior normal.         No results found for: \"\"  Lab Results   Component Value Date    K 4.3 04/22/2024     04/22/2024    CO2 28 04/22/2024    BUN 11 04/22/2024    CREATININE 0.76 04/22/2024    GLUF 104 (H) 04/22/2024    CALCIUM 9.4 04/22/2024    AST 20 04/22/2024    ALT 17 04/22/2024    ALKPHOS 83 04/22/2024    EGFR 79 04/22/2024     Lab Results   Component Value Date    WBC 5.95 04/22/2024    HGB 14.8 04/22/2024    HCT 46.8 (H) 04/22/2024    MCV 95 04/22/2024     04/22/2024     Lab Results   Component Value Date    NEUTROABS 4.28 04/22/2024          Colposcopy     Date/Time  5/21/2024 8:00 AM     Universal Protocol   Consent: Verbal consent obtained.  Patient understanding: patient states understanding of the procedure being performed  Patient identity confirmed: verbally with patient     Performed by  Rogelio Pedro MD   Authorized by  Rogelio Pedro MD     Indication    HSIL   Procedure Details   Procedure: Colposcopy of vagina w/ biopsy      [place] speculum was placed in the vagina: yes      " Biopsy(s): yes     Post-procedure      Findings: Mosaicism and White epithelium     Comments       Acetowhite epithelium at the 12:00 site and 7:00 site were identified at the vaginal cuff.  Biopsies attempted were performed for each however biopsy instrument failed.  The findings are consistent with high-grade BÁRBARA.

## 2024-05-24 PROCEDURE — 88305 TISSUE EXAM BY PATHOLOGIST: CPT | Performed by: STUDENT IN AN ORGANIZED HEALTH CARE EDUCATION/TRAINING PROGRAM

## 2024-06-04 ENCOUNTER — OFFICE VISIT (OUTPATIENT)
Dept: GYNECOLOGIC ONCOLOGY | Facility: CLINIC | Age: 74
End: 2024-06-04
Payer: MEDICARE

## 2024-06-04 VITALS
BODY MASS INDEX: 34.47 KG/M2 | SYSTOLIC BLOOD PRESSURE: 126 MMHG | OXYGEN SATURATION: 97 % | TEMPERATURE: 97.6 F | WEIGHT: 164.2 LBS | HEIGHT: 58 IN | RESPIRATION RATE: 18 BRPM | HEART RATE: 77 BPM | DIASTOLIC BLOOD PRESSURE: 74 MMHG

## 2024-06-04 DIAGNOSIS — D07.2 VAIN III (VAGINAL INTRAEPITHELIAL NEOPLASIA GRADE III): Primary | ICD-10-CM

## 2024-06-04 PROCEDURE — 99215 OFFICE O/P EST HI 40 MIN: CPT | Performed by: OBSTETRICS & GYNECOLOGY

## 2024-06-04 RX ORDER — ENOXAPARIN SODIUM 100 MG/ML
40 INJECTION SUBCUTANEOUS
OUTPATIENT
Start: 2024-06-05 | End: 2024-06-06

## 2024-06-04 RX ORDER — CEFAZOLIN SODIUM 1 G/50ML
1000 SOLUTION INTRAVENOUS ONCE
OUTPATIENT
Start: 2024-06-04 | End: 2024-06-04

## 2024-06-04 NOTE — PROGRESS NOTES
Assessment/Plan:    Problem List Items Addressed This Visit       VAIN III (vaginal intraepithelial neoplasia grade III) - Primary     Patient has now biopsy-proven recurrent LUIS ENRIQUE 3 of the upper vagina.  We have discussed treatment options of Efudex versus laser ablation.  Patient would prefer laser ablation.  This has been successful for her in the past.  We discussed risks and benefits including bleeding requiring transfusion infection and damage to local structures including bowel bladder and vagina.  Patient understands accepts the risk of surgery and has signed informed consent.  Preoperative testing has been ordered.  Patient will follow-up for surgery in the next few weeks.         Relevant Orders    Case request operating room: VAGINAL ABLATION WITH LASER CO2 (Completed)    Type and screen    Basic metabolic panel    CBC and Platelet    EKG 12 lead    XR chest pa & lateral           CHIEF COMPLAINT: Recurrent LUIS ENRIQUE 3            Patient ID: Jenny Gipson is a 73 y.o. female  Patient is a very pleasant 73-year-old female with a history of [facility] IN 3 status post laser ablation.  The patient was recently again identified with high-grade Pap smear.  Initial biopsy of upper vagina was negative.  Repeat biopsy performed of the mid upper vagina and the cuff at 12 and 7:00 was performed and read as the following:  Final Diagnosis A. Vagina, Biopsy: - High-grade squamous intraepithelial lesion/vaginal intraepithelial neoplasia     Patient tolerated biopsy without difficulty.Today, the patient is doing well.  She denies significant abdominal pain, pelvic pain, nausea, vomiting, constipation, diarrhea, fevers, chills, or vaginal bleeding.           Review of Systems   Constitutional: Negative.    HENT: Negative.     Eyes: Negative.    Respiratory: Negative.     Cardiovascular: Negative.    Gastrointestinal: Negative.    Endocrine: Negative.    Genitourinary: Negative.    Musculoskeletal: Negative.    Skin: Negative.   "  Neurological: Negative.    Hematological: Negative.    Psychiatric/Behavioral: Negative.         Current Outpatient Medications   Medication Sig Dispense Refill    Cholecalciferol (VITAMIN D3) 1,000 units tablet Take 1,000 Units by mouth daily      fluorouracil (EFUDEX) 5 % cream Apply topically (Patient not taking: Reported on 2024)       No current facility-administered medications for this visit.       Allergies   Allergen Reactions    Sulfamethoxazole-Trimethoprim     Penicillins Rash     \"fast heart beat\"       No past medical history on file.    Past Surgical History:   Procedure Laterality Date    APPENDECTOMY      BREAST BIOPSY Left 2023    intraductal papiloma    HYSTERECTOMY      US GUIDED BREAST BIOPSY LEFT COMPLETE Left 2023       OB History          1    Para   1    Term   1            AB        Living             SAB        IAB        Ectopic        Multiple        Live Births   1                 Family History   Problem Relation Age of Onset    No Known Problems Mother     No Known Problems Father     Breast cancer Sister 69       The following portions of the patient's history were reviewed and updated as appropriate: allergies, current medications, past family history, past medical history, past social history, past surgical history, and problem list.      Objective:    Blood pressure 126/74, pulse 77, temperature 97.6 °F (36.4 °C), temperature source Temporal, resp. rate 18, height 4' 10\" (1.473 m), weight 74.5 kg (164 lb 3.2 oz), SpO2 97%, not currently breastfeeding.  Body mass index is 34.32 kg/m².    Physical Exam  Constitutional:       Appearance: She is well-developed.   HENT:      Head: Normocephalic and atraumatic.   Neck:      Thyroid: No thyromegaly.   Cardiovascular:      Rate and Rhythm: Normal rate and regular rhythm.      Heart sounds: Normal heart sounds.   Pulmonary:      Effort: Pulmonary effort is normal.      Breath sounds: Normal breath sounds. " "  Abdominal:      General: Bowel sounds are normal.      Palpations: Abdomen is soft.      Comments: Well healed laparoscopic incisions.   Genitourinary:     Comments: -Normal external female genitalia, normal Bartholin's and [place]'s glands                  -Normal midline urethral meatus. No lesions notes                  -Bladder without fullness mass or tenderness                  -Vagina without lesion or discharge No significant cystocele or rectocele noted                  -Cervix surgically absent                  -Uterus surgically absent                  -Adnexae surgically absent                  - Anus without fissure of lesion    Musculoskeletal:         General: Normal range of motion.      Cervical back: Normal range of motion and neck supple.   Lymphadenopathy:      Cervical: No cervical adenopathy.   Skin:     General: Skin is warm and dry.   Neurological:      Mental Status: She is alert and oriented to person, place, and time.   Psychiatric:         Behavior: Behavior normal.           No results found for: \"\"  Lab Results   Component Value Date    WBC 5.95 04/22/2024    HGB 14.8 04/22/2024    HCT 46.8 (H) 04/22/2024    MCV 95 04/22/2024     04/22/2024     Lab Results   Component Value Date    K 4.3 04/22/2024     04/22/2024    CO2 28 04/22/2024    BUN 11 04/22/2024    CREATININE 0.76 04/22/2024    GLUF 104 (H) 04/22/2024    CALCIUM 9.4 04/22/2024    AST 20 04/22/2024    ALT 17 04/22/2024    ALKPHOS 83 04/22/2024    EGFR 79 04/22/2024            "

## 2024-06-04 NOTE — ASSESSMENT & PLAN NOTE
Patient has now biopsy-proven recurrent LUIS ENRIQUE 3 of the upper vagina.  We have discussed treatment options of Efudex versus laser ablation.  Patient would prefer laser ablation.  This has been successful for her in the past.  We discussed risks and benefits including bleeding requiring transfusion infection and damage to local structures including bowel bladder and vagina.  Patient understands accepts the risk of surgery and has signed informed consent.  Preoperative testing has been ordered.  Patient will follow-up for surgery in the next few weeks.

## 2024-07-02 ENCOUNTER — HOSPITAL ENCOUNTER (OUTPATIENT)
Dept: ULTRASOUND IMAGING | Facility: HOSPITAL | Age: 74
Discharge: HOME | End: 2024-07-02
Attending: SURGERY
Payer: MEDICARE

## 2024-07-02 ENCOUNTER — HOSPITAL ENCOUNTER (OUTPATIENT)
Dept: MAMMOGRAPHY | Facility: HOSPITAL | Age: 74
Discharge: HOME | End: 2024-07-02
Attending: SURGERY
Payer: MEDICARE

## 2024-07-02 DIAGNOSIS — D24.2 INTRADUCTAL PAPILLOMA OF BREAST, LEFT: ICD-10-CM

## 2024-07-02 DIAGNOSIS — Z12.31 ENCOUNTER FOR SCREENING MAMMOGRAM FOR MALIGNANT NEOPLASM OF BREAST: ICD-10-CM

## 2024-07-02 PROCEDURE — G0279 TOMOSYNTHESIS, MAMMO: HCPCS

## 2024-07-02 PROCEDURE — 77066 DX MAMMO INCL CAD BI: CPT

## 2024-07-02 PROCEDURE — 76642 ULTRASOUND BREAST LIMITED: CPT

## 2024-07-02 NOTE — RESULT ENCOUNTER NOTE
Happy to see that her breast findings are smaller than in the past and do not appear worrisome. Everything is benign. Her next imaging will be a bilateral screening mammogram in one year. Please confirm her upcoming visit for an exam. 78

## 2024-07-03 ENCOUNTER — OFFICE VISIT (OUTPATIENT)
Dept: SURGERY | Facility: CLINIC | Age: 74
End: 2024-07-03
Payer: MEDICARE

## 2024-07-03 VITALS
TEMPERATURE: 97.8 F | SYSTOLIC BLOOD PRESSURE: 130 MMHG | OXYGEN SATURATION: 98 % | DIASTOLIC BLOOD PRESSURE: 70 MMHG | RESPIRATION RATE: 16 BRPM | HEART RATE: 68 BPM | BODY MASS INDEX: 34.55 KG/M2 | WEIGHT: 164.6 LBS | HEIGHT: 58 IN

## 2024-07-03 DIAGNOSIS — D24.2 INTRADUCTAL PAPILLOMA OF BREAST, LEFT: Primary | ICD-10-CM

## 2024-07-03 DIAGNOSIS — Z12.31 ENCOUNTER FOR SCREENING MAMMOGRAM FOR MALIGNANT NEOPLASM OF BREAST: ICD-10-CM

## 2024-07-03 PROCEDURE — 99212 OFFICE O/P EST SF 10 MIN: CPT | Performed by: SURGERY

## 2024-07-03 NOTE — PROGRESS NOTES
Progress Note - Surgical Oncology  Jenny Gipson 73 y.o. 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 She is asymptomatic. The breast radiologist felt that the papilloma had some high risk imaging features including microlobulated margins, internal vascularity, heterogeneous appearance, not stable from prior appearance. These were reviewed with the patient. She understands that the lesion is benign and concordant but that some of the features above lead the radiologist to suggest surgical excisional biopsy of the site to rule out an upgrade to malignancy. She has a standard clip in place but the lesion would be amenable to [place]  localization. After discussing all options at our initial visit, we decided to follow the area closely before deciding on any surgical procedure on her breast.      Updated diagnostic ultrasound reviewed. The finding is still present but now smaller and less worrisome which is great. There has been no progression, there are no clinical symptoms. Her exam is benign. We will obtain a 1 year bilateral screening mammogram with an appointment to follow. She will contact me sooner if she develops any symptoms referable to her breasts and nipples.     She previously mentioned a right upper medial buttock lesion clinically consistent with a lipoma, she would like to monitor this for now, she is aware that we can excise that at any point but that it is entirely up to her.      We did help to arrange a dermatology skin check for her at her request as she has some skin findings on her upper back that she would like evaluated. She did attend two attempted visits with a local dermatologist but was not able to be seen either time. She would like to trial another local  "dermatologist in order to establish for skin checks, we will provide her with another name and contact number.       Subjective       Chief Complaint   Patient presents with    Follow-up      No symptoms referable to her breasts. Having surgery with Dr. Pedro in 1 month for vulvar and vaginal neoplasia. Mammogram and US reassuring with papilloma decreasing in size from prior, less worrisome, no new changes. 1 year screening mammo follow up recommended.       Review of Systems   Constitutional: Negative.    Skin: Negative.    Hematological: Negative.    All other systems reviewed and are negative.      The following portions of the patient's history were reviewed and updated as appropriate: allergies, current medications, past family history, past medical history, past social history, past surgical history, and problem list.    Objective      Blood pressure 130/70, pulse 68, temperature 97.8 °F (36.6 °C), temperature source Temporal, resp. rate 16, height 4' 10\" (1.473 m), weight 74.7 kg (164 lb 9.6 oz), SpO2 98%, not currently breastfeeding.   Physical Exam  Vitals reviewed.   Constitutional:       General: She is not in acute distress.     Appearance: She is not toxic-appearing.   HENT:      Head: Normocephalic.      Nose: No congestion.      Mouth/Throat:      Mouth: Mucous membranes are moist.   Eyes:      Pupils: Pupils are equal, round, and reactive to light.   Cardiovascular:      Rate and Rhythm: Normal rate.   Pulmonary:      Effort: Pulmonary effort is normal.   Abdominal:      Palpations: Abdomen is soft.   Musculoskeletal:         General: Normal range of motion.      Cervical back: Normal range of motion.   Skin:     General: Skin is warm.      Capillary Refill: Capillary refill takes less than 2 seconds.   Neurological:      General: No focal deficit present.      Mental Status: She is alert.   Psychiatric:         Mood and Affect: Mood normal.     The patient has no palpable cervical, supraclavicular, " or axillary lymphadenopathy bilaterally.  The breasts are symmetric.  There are no dominant lumps, masses, skin changes or nipple retraction bilaterally.     Signature:  Lalito Cervantes MD  Date: 7/3/2024 Time: 11:05 AM

## 2024-07-11 ENCOUNTER — HOSPITAL ENCOUNTER (OUTPATIENT)
Dept: RADIOLOGY | Facility: HOSPITAL | Age: 74
End: 2024-07-11
Payer: MEDICARE

## 2024-07-11 ENCOUNTER — APPOINTMENT (OUTPATIENT)
Dept: LAB | Facility: HOSPITAL | Age: 74
End: 2024-07-11
Payer: MEDICARE

## 2024-07-11 ENCOUNTER — OFFICE VISIT (OUTPATIENT)
Dept: LAB | Facility: HOSPITAL | Age: 74
End: 2024-07-11
Payer: MEDICARE

## 2024-07-11 DIAGNOSIS — D07.2 VAIN III (VAGINAL INTRAEPITHELIAL NEOPLASIA GRADE III): ICD-10-CM

## 2024-07-11 LAB
ANION GAP SERPL CALCULATED.3IONS-SCNC: 8 MMOL/L (ref 4–13)
BUN SERPL-MCNC: 14 MG/DL (ref 5–25)
CALCIUM SERPL-MCNC: 9.6 MG/DL (ref 8.4–10.2)
CHLORIDE SERPL-SCNC: 106 MMOL/L (ref 96–108)
CO2 SERPL-SCNC: 28 MMOL/L (ref 21–32)
CREAT SERPL-MCNC: 0.75 MG/DL (ref 0.6–1.3)
ERYTHROCYTE [DISTWIDTH] IN BLOOD BY AUTOMATED COUNT: 13 % (ref 11.6–15.1)
GFR SERPL CREATININE-BSD FRML MDRD: 80 ML/MIN/1.73SQ M
GLUCOSE P FAST SERPL-MCNC: 95 MG/DL (ref 65–99)
HCT VFR BLD AUTO: 46.2 % (ref 34.8–46.1)
HGB BLD-MCNC: 15 G/DL (ref 11.5–15.4)
MCH RBC QN AUTO: 31 PG (ref 26.8–34.3)
MCHC RBC AUTO-ENTMCNC: 32.5 G/DL (ref 31.4–37.4)
MCV RBC AUTO: 96 FL (ref 82–98)
PLATELET # BLD AUTO: 199 THOUSANDS/UL (ref 149–390)
PMV BLD AUTO: 9.3 FL (ref 8.9–12.7)
POTASSIUM SERPL-SCNC: 4.2 MMOL/L (ref 3.5–5.3)
RBC # BLD AUTO: 4.84 MILLION/UL (ref 3.81–5.12)
SODIUM SERPL-SCNC: 142 MMOL/L (ref 135–147)
WBC # BLD AUTO: 5.46 THOUSAND/UL (ref 4.31–10.16)

## 2024-07-11 PROCEDURE — 36415 COLL VENOUS BLD VENIPUNCTURE: CPT

## 2024-07-11 PROCEDURE — 93005 ELECTROCARDIOGRAM TRACING: CPT

## 2024-07-11 PROCEDURE — 71046 X-RAY EXAM CHEST 2 VIEWS: CPT

## 2024-07-11 PROCEDURE — 86901 BLOOD TYPING SEROLOGIC RH(D): CPT | Performed by: OBSTETRICS & GYNECOLOGY

## 2024-07-11 PROCEDURE — 86900 BLOOD TYPING SEROLOGIC ABO: CPT | Performed by: OBSTETRICS & GYNECOLOGY

## 2024-07-11 PROCEDURE — 85027 COMPLETE CBC AUTOMATED: CPT

## 2024-07-11 PROCEDURE — 86850 RBC ANTIBODY SCREEN: CPT | Performed by: OBSTETRICS & GYNECOLOGY

## 2024-07-11 PROCEDURE — 80048 BASIC METABOLIC PNL TOTAL CA: CPT

## 2024-07-12 ENCOUNTER — LAB REQUISITION (OUTPATIENT)
Dept: LAB | Facility: HOSPITAL | Age: 74
End: 2024-07-12
Payer: MEDICARE

## 2024-07-12 DIAGNOSIS — D07.2 CARCINOMA IN SITU OF VAGINA: ICD-10-CM

## 2024-07-12 LAB
ABO GROUP BLD: NORMAL
ATRIAL RATE: 66 BPM
BLD GP AB SCN SERPL QL: NEGATIVE
P AXIS: 9 DEGREES
PR INTERVAL: 176 MS
QRS AXIS: -5 DEGREES
QRSD INTERVAL: 74 MS
QT INTERVAL: 398 MS
QTC INTERVAL: 417 MS
RH BLD: POSITIVE
SPECIMEN EXPIRATION DATE: NORMAL
T WAVE AXIS: 3 DEGREES
VENTRICULAR RATE: 66 BPM

## 2024-07-12 PROCEDURE — 93010 ELECTROCARDIOGRAM REPORT: CPT | Performed by: INTERNAL MEDICINE

## 2024-07-24 NOTE — PRE-PROCEDURE INSTRUCTIONS
Pre-Surgery Instructions:   Medication Instructions    Cholecalciferol (VITAMIN D3) 1,000 units tablet Hold day of surgery.   Medication instructions for day surgery reviewed. Please use only a sip of water to take your instructed medications. Avoid all over the counter vitamins, supplements and NSAIDS for one week prior to surgery per anesthesia guidelines. Tylenol is ok to take as needed.     You will receive a call one business day prior to surgery with an arrival time and hospital directions. If your surgery is scheduled on a Monday, the hospital will be calling you on the Friday prior to your surgery. If you have not heard from anyone by 8pm, please call the hospital supervisor through the hospital  at [phone]. ([place] [phone] or [place] [phone]).    Do not eat or drink anything after midnight the night before your surgery, including candy, mints, lifesavers, or chewing gum. Do not drink alcohol 24hrs before your surgery. Try not to smoke at least 24hrs before your surgery.       Follow the pre surgery showering instructions as listed in the “My Surgical Experience Booklet” or otherwise provided by your surgeon's office. Do not use a blade to shave the surgical area 1 week before surgery. It is okay to use a clean electric clippers up to 24 hours before surgery. Do not apply any lotions, creams, including makeup, cologne, deodorant, or perfumes after showering on the day of your surgery. Do not use dry shampoo, hair spray, hair gel, or any type of hair products.     No contact lenses, eye make-up, or artificial eyelashes. Remove nail polish, including gel polish, and any artificial, gel, or acrylic nails if possible. Remove all jewelry including rings and body piercing jewelry.     Wear causal clothing that is easy to take on and off. Consider your type of surgery.    Keep any valuables, jewelry, piercings at home. Please bring any specially ordered equipment (sling, braces) if  indicated.    Arrange for a responsible person to drive you to and from the hospital on the day of your surgery. Please confirm the visitor policy for the day of your procedure when you receive your phone call with an arrival time.     Call the surgeon's office with any new illnesses, exposures, or additional questions prior to surgery.    Please reference your “My Surgical Experience Booklet” for additional information to prepare for your upcoming surgery.

## 2024-07-31 ENCOUNTER — ANESTHESIA EVENT (OUTPATIENT)
Dept: PERIOP | Facility: HOSPITAL | Age: 74
End: 2024-07-31
Payer: MEDICARE

## 2024-08-01 PROCEDURE — NC001 PR NO CHARGE: Performed by: OBSTETRICS & GYNECOLOGY

## 2024-08-01 NOTE — H&P
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  We have discussed treatment options of Efudex versus laser ablation.  Patient would prefer laser ablation.  This has been successful for her in the past.  We discussed risks and benefits including bleeding requiring transfusion infection and damage to local structures including bowel bladder and vagina.  Patient understands accepts the risk of surgery and has signed informed consent.  Preoperative testing has been ordered.  Patient will follow-up for surgery in the next few weeks.           Relevant Orders     Case request operating room: VAGINAL ABLATION WITH LASER CO2 (Completed)     Type and screen     Basic metabolic panel     CBC and Platelet     EKG 12 lead     XR chest pa & lateral               CHIEF COMPLAINT: Recurrent LUIS ENRIQUE 3                 Patient ID: Jenny Gipson is a 73 y.o. female  Patient is a very pleasant 73-year-old female with a history of VA IN 3 status post laser ablation.  The patient was recently again identified with high-grade Pap smear.  Initial biopsy of upper vagina was negative.  Repeat biopsy performed of the mid upper vagina and the cuff at 12 and 7:00 was performed and read as the following:  Final Diagnosis A. Vagina, Biopsy: - High-grade squamous intraepithelial lesion/vaginal intraepithelial neoplasia      Patient tolerated biopsy without difficulty.Today, the patient is doing well.  She denies significant abdominal pain, pelvic pain, nausea, vomiting, constipation, diarrhea, fevers, chills, or vaginal bleeding.              Review of Systems   Constitutional: Negative.    HENT: Negative.     Eyes: Negative.    Respiratory: Negative.     Cardiovascular: Negative.    Gastrointestinal: Negative.    Endocrine: Negative.    Genitourinary: Negative.    Musculoskeletal: Negative.   "  Skin: Negative.    Neurological: Negative.    Hematological: Negative.    Psychiatric/Behavioral: Negative.           Current Medications          Current Outpatient Medications   Medication Sig Dispense Refill    Cholecalciferol (VITAMIN D3) 1,000 units tablet Take 1,000 Units by mouth daily        fluorouracil (EFUDEX) 5 % cream Apply topically (Patient not taking: Reported on 2024)          No current facility-administered medications for this visit.            Allergies         Allergies   Allergen Reactions    Sulfamethoxazole-Trimethoprim      Penicillins Rash       \"fast heart beat\"            Medical History   No past medical history on file.        Surgical History         Past Surgical History:   Procedure Laterality Date    APPENDECTOMY        BREAST BIOPSY Left 2023     intraductal papiloma    HYSTERECTOMY        US GUIDED BREAST BIOPSY LEFT COMPLETE Left 2023            OB History            1    Para   1    Term   1            AB        Living               SAB        IAB        Ectopic        Multiple        Live Births   1                     Family History         Family History   Problem Relation Age of Onset    No Known Problems Mother      No Known Problems Father      Breast cancer Sister 69            The following portions of the patient's history were reviewed and updated as appropriate: allergies, current medications, past family history, past medical history, past social history, past surgical history, and problem list.        Objective:     Blood pressure 126/74, pulse 77, temperature 97.6 °F (36.4 °C), temperature source Temporal, resp. rate 18, height 4' 10\" (1.473 m), weight 74.5 kg (164 lb 3.2 oz), SpO2 97%, not currently breastfeeding.  Body mass index is 34.32 kg/m².     Physical Exam  Constitutional:       Appearance: She is well-developed.   HENT:      Head: Normocephalic and atraumatic.   Neck:      Thyroid: No thyromegaly.   Cardiovascular:      " "Rate and Rhythm: Normal rate and regular rhythm.      Heart sounds: Normal heart sounds.   Pulmonary:      Effort: Pulmonary effort is normal.      Breath sounds: Normal breath sounds.   Abdominal:      General: Bowel sounds are normal.      Palpations: Abdomen is soft.      Comments: Well healed laparoscopic incisions.   Genitourinary:     Comments: -Normal external female genitalia, normal Bartholin's and [place]'s glands                  -Normal midline urethral meatus. No lesions notes                  -Bladder without fullness mass or tenderness                  -Vagina without lesion or discharge No significant cystocele or rectocele noted                  -Cervix surgically absent                  -Uterus surgically absent                  -Adnexae surgically absent                  - Anus without fissure of lesion     Musculoskeletal:         General: Normal range of motion.      Cervical back: Normal range of motion and neck supple.   Lymphadenopathy:      Cervical: No cervical adenopathy.   Skin:     General: Skin is warm and dry.   Neurological:      Mental Status: She is alert and oriented to person, place, and time.   Psychiatric:         Behavior: Behavior normal.               No results found for: \"\"        Lab Results   Component Value Date     WBC 5.95 04/22/2024     HGB 14.8 04/22/2024     HCT 46.8 (H) 04/22/2024     MCV 95 04/22/2024      04/22/2024            Lab Results   Component Value Date     K 4.3 04/22/2024      04/22/2024     CO2 28 04/22/2024     BUN 11 04/22/2024     CREATININE 0.76 04/22/2024     GLUF 104 (H) 04/22/2024     CALCIUM 9.4 04/22/2024     AST 20 04/22/2024     ALT 17 04/22/2024     ALKPHOS 83 04/22/2024     EGFR 79 04/22/2024      "

## 2024-08-02 ENCOUNTER — HOSPITAL ENCOUNTER (OUTPATIENT)
Facility: HOSPITAL | Age: 74
Setting detail: OUTPATIENT SURGERY
Discharge: HOME | End: 2024-08-02
Attending: OBSTETRICS & GYNECOLOGY | Admitting: OBSTETRICS & GYNECOLOGY
Payer: MEDICARE

## 2024-08-02 ENCOUNTER — ANESTHESIA (OUTPATIENT)
Dept: PERIOP | Facility: HOSPITAL | Age: 74
End: 2024-08-02
Payer: MEDICARE

## 2024-08-02 VITALS
DIASTOLIC BLOOD PRESSURE: 76 MMHG | OXYGEN SATURATION: 93 % | HEART RATE: 66 BPM | BODY MASS INDEX: 34.48 KG/M2 | HEIGHT: 58 IN | TEMPERATURE: 96.9 F | WEIGHT: 164.24 LBS | SYSTOLIC BLOOD PRESSURE: 152 MMHG | RESPIRATION RATE: 18 BRPM

## 2024-08-02 DIAGNOSIS — D07.2 VAIN III (VAGINAL INTRAEPITHELIAL NEOPLASIA GRADE III): Primary | ICD-10-CM

## 2024-08-02 PROBLEM — K21.9 GASTROESOPHAGEAL REFLUX DISEASE: Status: ACTIVE | Noted: 2024-08-02

## 2024-08-02 PROBLEM — E66.9 OBESITY (BMI 30.0-34.9): Status: ACTIVE | Noted: 2024-08-02

## 2024-08-02 PROBLEM — E66.811 OBESITY (BMI 30.0-34.9): Status: ACTIVE | Noted: 2024-08-02

## 2024-08-02 LAB
ABO GROUP BLD: NORMAL
RH BLD: POSITIVE

## 2024-08-02 PROCEDURE — 57061 DESTRUCTION VAG LESIONS SMPL: CPT | Performed by: OBSTETRICS & GYNECOLOGY

## 2024-08-02 RX ORDER — KETOROLAC TROMETHAMINE 30 MG/ML
INJECTION, SOLUTION INTRAMUSCULAR; INTRAVENOUS AS NEEDED
Status: DISCONTINUED | OUTPATIENT
Start: 2024-08-02 | End: 2024-08-02

## 2024-08-02 RX ORDER — PROPOFOL 10 MG/ML
INJECTION, EMULSION INTRAVENOUS AS NEEDED
Status: DISCONTINUED | OUTPATIENT
Start: 2024-08-02 | End: 2024-08-02

## 2024-08-02 RX ORDER — SENNOSIDES 8.6 MG
650 CAPSULE ORAL EVERY 6 HOURS PRN
Start: 2024-08-02

## 2024-08-02 RX ORDER — DEXAMETHASONE SODIUM PHOSPHATE 10 MG/ML
INJECTION, SOLUTION INTRAMUSCULAR; INTRAVENOUS AS NEEDED
Status: DISCONTINUED | OUTPATIENT
Start: 2024-08-02 | End: 2024-08-02

## 2024-08-02 RX ORDER — ONDANSETRON 2 MG/ML
INJECTION INTRAMUSCULAR; INTRAVENOUS AS NEEDED
Status: DISCONTINUED | OUTPATIENT
Start: 2024-08-02 | End: 2024-08-02

## 2024-08-02 RX ORDER — FENTANYL CITRATE 50 UG/ML
INJECTION, SOLUTION INTRAMUSCULAR; INTRAVENOUS AS NEEDED
Status: DISCONTINUED | OUTPATIENT
Start: 2024-08-02 | End: 2024-08-02

## 2024-08-02 RX ORDER — ONDANSETRON 2 MG/ML
4 INJECTION INTRAMUSCULAR; INTRAVENOUS EVERY 6 HOURS PRN
Status: DISCONTINUED | OUTPATIENT
Start: 2024-08-02 | End: 2024-08-02 | Stop reason: HOSPADM

## 2024-08-02 RX ORDER — LIDOCAINE HYDROCHLORIDE 20 MG/ML
INJECTION, SOLUTION EPIDURAL; INFILTRATION; INTRACAUDAL; PERINEURAL AS NEEDED
Status: DISCONTINUED | OUTPATIENT
Start: 2024-08-02 | End: 2024-08-02

## 2024-08-02 RX ORDER — ENOXAPARIN SODIUM 100 MG/ML
40 INJECTION SUBCUTANEOUS
Status: COMPLETED | OUTPATIENT
Start: 2024-08-02 | End: 2024-08-02

## 2024-08-02 RX ORDER — SODIUM CHLORIDE, SODIUM LACTATE, POTASSIUM CHLORIDE, CALCIUM CHLORIDE 600; 310; 30; 20 MG/100ML; MG/100ML; MG/100ML; MG/100ML
125 INJECTION, SOLUTION INTRAVENOUS CONTINUOUS
Status: DISCONTINUED | OUTPATIENT
Start: 2024-08-02 | End: 2024-08-02 | Stop reason: HOSPADM

## 2024-08-02 RX ORDER — HYDROMORPHONE HCL/PF 1 MG/ML
0.5 SYRINGE (ML) INJECTION
Status: DISCONTINUED | OUTPATIENT
Start: 2024-08-02 | End: 2024-08-02 | Stop reason: HOSPADM

## 2024-08-02 RX ORDER — OXYCODONE HYDROCHLORIDE 5 MG/1
5 TABLET ORAL EVERY 4 HOURS PRN
Status: DISCONTINUED | OUTPATIENT
Start: 2024-08-02 | End: 2024-08-02 | Stop reason: HOSPADM

## 2024-08-02 RX ORDER — MAGNESIUM HYDROXIDE 1200 MG/15ML
LIQUID ORAL AS NEEDED
Status: DISCONTINUED | OUTPATIENT
Start: 2024-08-02 | End: 2024-08-02 | Stop reason: HOSPADM

## 2024-08-02 RX ORDER — ACETAMINOPHEN 325 MG/1
975 TABLET ORAL EVERY 6 HOURS PRN
Status: DISCONTINUED | OUTPATIENT
Start: 2024-08-02 | End: 2024-08-02 | Stop reason: HOSPADM

## 2024-08-02 RX ORDER — FENTANYL CITRATE/PF 50 MCG/ML
25 SYRINGE (ML) INJECTION
Status: DISCONTINUED | OUTPATIENT
Start: 2024-08-02 | End: 2024-08-02 | Stop reason: HOSPADM

## 2024-08-02 RX ORDER — SUCCINYLCHOLINE/SOD CL,ISO/PF 100 MG/5ML
SYRINGE (ML) INTRAVENOUS AS NEEDED
Status: DISCONTINUED | OUTPATIENT
Start: 2024-08-02 | End: 2024-08-02

## 2024-08-02 RX ORDER — PANTOPRAZOLE SODIUM 40 MG/10ML
40 INJECTION, POWDER, LYOPHILIZED, FOR SOLUTION INTRAVENOUS ONCE
Status: DISCONTINUED | OUTPATIENT
Start: 2024-08-02 | End: 2024-08-02 | Stop reason: HOSPADM

## 2024-08-02 RX ORDER — ROCURONIUM BROMIDE 10 MG/ML
INJECTION, SOLUTION INTRAVENOUS AS NEEDED
Status: DISCONTINUED | OUTPATIENT
Start: 2024-08-02 | End: 2024-08-02

## 2024-08-02 RX ORDER — CITRIC ACID/SODIUM CITRATE 334-500MG
30 SOLUTION, ORAL ORAL ONCE
Status: DISCONTINUED | OUTPATIENT
Start: 2024-08-02 | End: 2024-08-02 | Stop reason: HOSPADM

## 2024-08-02 RX ORDER — CEFAZOLIN SODIUM 1 G/50ML
1000 SOLUTION INTRAVENOUS ONCE
Status: COMPLETED | OUTPATIENT
Start: 2024-08-02 | End: 2024-08-02

## 2024-08-02 RX ORDER — EPHEDRINE SULFATE 50 MG/ML
INJECTION INTRAVENOUS AS NEEDED
Status: DISCONTINUED | OUTPATIENT
Start: 2024-08-02 | End: 2024-08-02

## 2024-08-02 RX ORDER — IBUPROFEN 600 MG/1
600 TABLET ORAL EVERY 6 HOURS PRN
Start: 2024-08-02

## 2024-08-02 RX ORDER — OXYCODONE HYDROCHLORIDE 10 MG/1
10 TABLET ORAL EVERY 4 HOURS PRN
Status: DISCONTINUED | OUTPATIENT
Start: 2024-08-02 | End: 2024-08-02 | Stop reason: HOSPADM

## 2024-08-02 RX ORDER — ONDANSETRON 2 MG/ML
4 INJECTION INTRAMUSCULAR; INTRAVENOUS ONCE AS NEEDED
Status: DISCONTINUED | OUTPATIENT
Start: 2024-08-02 | End: 2024-08-02 | Stop reason: HOSPADM

## 2024-08-02 RX ADMIN — CEFAZOLIN SODIUM 1000 MG: 1 SOLUTION INTRAVENOUS at 12:57

## 2024-08-02 RX ADMIN — PROPOFOL 30 MG: 10 INJECTION, EMULSION INTRAVENOUS at 13:01

## 2024-08-02 RX ADMIN — Medication 100 MG: at 12:52

## 2024-08-02 RX ADMIN — KETOROLAC TROMETHAMINE 15 MG: 30 INJECTION, SOLUTION INTRAMUSCULAR; INTRAVENOUS at 13:23

## 2024-08-02 RX ADMIN — ONDANSETRON 4 MG: 2 INJECTION INTRAMUSCULAR; INTRAVENOUS at 12:58

## 2024-08-02 RX ADMIN — FENTANYL CITRATE 50 MCG: 50 INJECTION INTRAMUSCULAR; INTRAVENOUS at 12:52

## 2024-08-02 RX ADMIN — PROPOFOL 150 MG: 10 INJECTION, EMULSION INTRAVENOUS at 12:52

## 2024-08-02 RX ADMIN — SODIUM CHLORIDE, SODIUM LACTATE, POTASSIUM CHLORIDE, AND CALCIUM CHLORIDE: .6; .31; .03; .02 INJECTION, SOLUTION INTRAVENOUS at 13:12

## 2024-08-02 RX ADMIN — FENTANYL CITRATE 25 MCG: 50 INJECTION INTRAMUSCULAR; INTRAVENOUS at 13:02

## 2024-08-02 RX ADMIN — SODIUM CHLORIDE, SODIUM LACTATE, POTASSIUM CHLORIDE, AND CALCIUM CHLORIDE 125 ML/HR: .6; .31; .03; .02 INJECTION, SOLUTION INTRAVENOUS at 06:39

## 2024-08-02 RX ADMIN — DEXAMETHASONE SODIUM PHOSPHATE 10 MG: 10 INJECTION INTRAMUSCULAR; INTRAVENOUS at 12:58

## 2024-08-02 RX ADMIN — LIDOCAINE HYDROCHLORIDE 60 MG: 20 INJECTION, SOLUTION EPIDURAL; INFILTRATION; INTRACAUDAL at 12:52

## 2024-08-02 RX ADMIN — ENOXAPARIN SODIUM 40 MG: 40 INJECTION SUBCUTANEOUS at 06:56

## 2024-08-02 RX ADMIN — EPHEDRINE SULFATE 5 MG: 50 INJECTION, SOLUTION INTRAVENOUS at 13:10

## 2024-08-02 RX ADMIN — ROCURONIUM BROMIDE 5 MG: 50 INJECTION, SOLUTION INTRAVENOUS at 12:52

## 2024-08-02 NOTE — OP NOTE
OPERATIVE REPORT  PATIENT NAME: Jenny Gipson    :  1950  MRN: [id number]  Pt Location: [facility] OR ROOM 07    SURGERY DATE: 2024    Surgeons and Role:     * Rogelio Pedro MD - Primary     * Kathy Castro MD - Assisting    Preop Diagnosis:  VAIN III (vaginal intraepithelial neoplasia grade III) D07.2    Post-Op Diagnosis Codes:     * VAIN III (vaginal intraepithelial neoplasia grade III) [D07.2]    Procedure(s):  LASER ABLATION OF UPPER VAGINA. EXAM UNDER ANESTHESIA    Specimen(s):  * No specimens in log *    Estimated Blood Loss:   Minimal    Drains:  * No LDAs found *    Anesthesia Type:   Choice    Operative Indications:  VAIN III (vaginal intraepithelial neoplasia grade III) D07.2      Operative Findings:    No visible lesions were noted    Complications:   None    Procedure and Technique:  Patient was placed in dorsal lithotomy position and prepped and draped in the usual sterile fashion.  The CO2 laser at 5 W continuous was used to ablate the upper vagina and 5 separate passes.  Minimal spotting was noted.  The patient tolerated procedure without complication   Counts were correct prior to closure and hemostasis was assured prior to closure   I was present for the entire procedure.    Patient Disposition:  PACU         SIGNATURE: Rogelio Pedro MD  DATE: 2024  TIME: 1:23 PM

## 2024-08-02 NOTE — DISCHARGE INSTRUCTIONS
Nothing in the vagina for 2 weeks  No pools, hot tubs, or submersion in water for 2 weeks  Spotting may be normal. Call or return if you have pus coming from your vagina or heavy vaginal bleeding  Call with fever, pain that is worsening instead of improving  Present to the emergency room for any emergency   Take tylenol and motrin as needed for pain. You can alternate between the two, taking tylenol, then motrin 3 hours later, then tylenol again 3 hours later etc.   Please go to your post op appointment with Dr. Pdero on 8/19/24  Please do not hesitate to call if you have any questions

## 2024-08-02 NOTE — ANESTHESIA POSTPROCEDURE EVALUATION
Post-Op Assessment Note    CV Status:  Stable    Pain management: adequate       Mental Status:  Alert and awake   Hydration Status:  Euvolemic   PONV Controlled:  Controlled   Airway Patency:  Patent     Post Op Vitals Reviewed: Yes    No anethesia notable event occurred.    Staff: Anesthesiologist, CRNA               /87 (08/02/24 1405)    Temp 97.7 °F (36.5 °C) (08/02/24 1405)    Pulse 70 (08/02/24 1405)   Resp 20 (08/02/24 1405)    SpO2 96 % (08/02/24 1405)

## 2024-08-02 NOTE — ANESTHESIA PREPROCEDURE EVALUATION
Procedure:  LASER ABLATION OF UPPER VAGINA, EXAM UNDER ANESTHESIA (Vagina )    Relevant Problems   GI/HEPATIC   (+) Gastroesophageal reflux disease      Other   (+) Obesity (BMI 30.0-34.9)        Physical Exam    Airway  Comment: Small mouth  Mallampati score: III  TM Distance: >3 FB  Neck ROM: full     Dental   No notable dental hx     Cardiovascular  Rhythm: regular, Rate: normal, Cardiovascular exam normal    Pulmonary  Pulmonary exam normal Breath sounds clear to auscultation    Other Findings  post-pubertal.      Anesthesia Plan  ASA Score- 2     Anesthesia Type- general with ASA Monitors.         Additional Monitors:     Airway Plan: ETT and LMA.    Comment: ETT vs LMA.       Plan Factors-    Chart reviewed.    Patient summary reviewed.                  Induction- intravenous.    Postoperative Plan-         Informed Consent- Anesthetic plan and risks discussed with patient.

## 2024-08-02 NOTE — INTERVAL H&P NOTE
H&P reviewed. After examining the patient I find no changes in the patients condition since the H&P had been written.plan laser ablation of upper vagina for vain.  Preop testing stable for surgery.

## 2024-08-19 ENCOUNTER — OFFICE VISIT (OUTPATIENT)
Dept: GYNECOLOGIC ONCOLOGY | Facility: CLINIC | Age: 74
End: 2024-08-19

## 2024-08-19 VITALS
HEART RATE: 71 BPM | WEIGHT: 163 LBS | RESPIRATION RATE: 18 BRPM | OXYGEN SATURATION: 99 % | DIASTOLIC BLOOD PRESSURE: 70 MMHG | SYSTOLIC BLOOD PRESSURE: 122 MMHG | HEIGHT: 58 IN | TEMPERATURE: 97.8 F | BODY MASS INDEX: 34.22 KG/M2

## 2024-08-19 DIAGNOSIS — D07.2 VAIN III (VAGINAL INTRAEPITHELIAL NEOPLASIA GRADE III): Primary | ICD-10-CM

## 2024-08-19 PROCEDURE — 99024 POSTOP FOLLOW-UP VISIT: CPT | Performed by: OBSTETRICS & GYNECOLOGY

## 2024-08-19 NOTE — ASSESSMENT & PLAN NOTE
Patient is a very pleasant 73-year-old female with a history of recurrent LUIS ENRIQUE 3.  She underwent laser vaporization without difficulty.  The area is healing well.  The patient will be seen in 6 months to initiate Pap smear surveillance.

## 2024-09-08 ENCOUNTER — RA CDI HCC (OUTPATIENT)
Dept: OTHER | Facility: HOSPITAL | Age: 74
End: 2024-09-08

## 2024-09-16 ENCOUNTER — OFFICE VISIT (OUTPATIENT)
Dept: FAMILY MEDICINE CLINIC | Facility: CLINIC | Age: 74
End: 2024-09-16
Payer: MEDICARE

## 2024-09-16 VITALS
SYSTOLIC BLOOD PRESSURE: 130 MMHG | HEIGHT: 58 IN | WEIGHT: 164.8 LBS | BODY MASS INDEX: 34.59 KG/M2 | OXYGEN SATURATION: 97 % | HEART RATE: 73 BPM | TEMPERATURE: 97.3 F | DIASTOLIC BLOOD PRESSURE: 78 MMHG

## 2024-09-16 DIAGNOSIS — Z00.00 MEDICARE ANNUAL WELLNESS VISIT, SUBSEQUENT: Primary | ICD-10-CM

## 2024-09-16 PROCEDURE — G0439 PPPS, SUBSEQ VISIT: HCPCS | Performed by: FAMILY MEDICINE

## 2024-09-16 PROCEDURE — G0444 DEPRESSION SCREEN ANNUAL: HCPCS | Performed by: FAMILY MEDICINE

## 2024-09-16 NOTE — PROGRESS NOTES
Ambulatory Visit  Name: Jenny Gipson      : 1950      MRN: [id number]  Encounter Provider: Mitchell Mcmullen MD  Encounter Date: 2024   Encounter department: [facility]    Assessment & Plan  Medicare annual wellness visit, subsequent            Preventive health issues were discussed with patient, and age appropriate screening tests were ordered as noted in patient's After Visit Summary. Personalized health advice and appropriate referrals for health education or preventive services given if needed, as noted in patient's After Visit Summary.    History of Present Illness     Patient presents to the office today for Medicare and wellness visit.  No changes in her health since last year.  She occasionally gets some left leg pain which last about 5 to 10 minutes.  This is not often maybe 2 times a month.  It is usually when she is working at [employer].  She takes some Tylenol and it goes away.       Patient Care Team:  Mitchell Mcmullen MD as PCP - General (Family Medicine)  Lalito Cervantes MD (General Surgery)  Rogelio Pedro MD (Gynecologic Oncology)    Review of Systems   All other systems reviewed and are negative.    Medical History Reviewed by provider this encounter:  Tobacco  Allergies  Meds  Problems  Med Hx  Surg Hx  Fam Hx       Annual Wellness Visit Questionnaire   Jenny is here for her Subsequent Wellness visit. Last Medicare Wellness visit information reviewed, patient interviewed and updates made to the record today.      Health Risk Assessment:   Patient rates overall health as very good. Patient feels that their physical health rating is same. Patient is very satisfied with their life. Eyesight was rated as same. Hearing was rated as same. Patient feels that their emotional and mental health rating is slightly better. Patients states they are sometimes angry. Patient states they are sometimes unusually tired/fatigued. Pain experienced in the last 7  days has been some. Patient's pain rating has been 3/10. Patient states that she has experienced no weight loss or gain in last 6 months.     Depression Screening:   PHQ-2 Score: 0      Fall Risk Screening:   In the past year, patient has experienced: no history of falling in past year      Urinary Incontinence Screening:   Patient has not leaked urine accidently in the last six months.     Home Safety:  Patient does not have trouble with stairs inside or outside of their home. Patient has working smoke alarms and has working carbon monoxide detector. Home safety hazards include: none.     Nutrition:   Current diet is Regular.     Medications:   Patient is not currently taking any over-the-counter supplements. Patient is not able to manage medications.     Activities of Daily Living (ADLs)/Instrumental Activities of Daily Living (IADLs):   Walk and transfer into and out of bed and chair?: Yes  Dress and groom yourself?: Yes    Bathe or shower yourself?: Yes    Feed yourself? Yes  Do your laundry/housekeeping?: Yes  Manage your money, pay your bills and track your expenses?: Yes  Make your own meals?: Yes    Do your own shopping?: Yes    Previous Hospitalizations:   Any hospitalizations or ED visits within the last 12 months?: Yes    How many hospitalizations have you had in the last year?: 1-2    Advance Care Planning:   Living will: No    Durable POA for healthcare: No    Advanced directive: No    Advanced directive counseling given: Yes    ACP document given: Yes    End of Life Decisions reviewed with patient: Yes      Cognitive Screening:   Provider or family/friend/caregiver concerned regarding cognition?: No    PREVENTIVE SCREENINGS      Cardiovascular Screening:    General: Screening Current      Diabetes Screening:     General: Screening Current      Colorectal Cancer Screening:     General: Screening Current      Breast Cancer Screening:     General: Screening Current      Cervical Cancer Screening:     General: Screening Not Indicated      Osteoporosis Screening:    General: Risks and Benefits Discussed and Screening Current      Abdominal Aortic Aneurysm (AAA) Screening:        General: Risks and Benefits Discussed and Screening Not Indicated      Lung Cancer Screening:     General: Screening Not Indicated      Hepatitis C Screening:    General: Risks and Benefits Discussed    Hep C Screening Accepted: No     Screening, Brief Intervention, and Referral to Treatment (SBIRT)    Screening  Typical number of drinks in a day: 0  Typical number of drinks in a week: 0  Interpretation: Low risk drinking behavior.    Single Item Drug Screening:  How often have you used an illegal drug (including marijuana) or a prescription medication for non-medical reasons in the past year? never    Single Item Drug Screen Score: 0  Interpretation: Negative screen for possible drug use disorder    Brief Intervention  Alcohol & drug use screenings were reviewed. No concerns regarding substance use disorder identified.     Annual Depression Screening  Time spent screening and evaluating the patient for depression during today's encounter was 5 minutes.    Other Counseling Topics:   Car/seat belt/driving safety, skin self-exam, sunscreen and calcium and vitamin D intake and regular weightbearing exercise.     Social Determinants of Health     Financial Resource Strain: Low Risk  (9/11/2023)    Overall Financial Resource Strain (CARDIA)     Difficulty of Paying Living Expenses: Not hard at all   Food Insecurity: No Food Insecurity (9/16/2024)    Hunger Vital Sign     Worried About Running Out of Food in the Last Year: Never true     Ran Out of Food in the Last Year: Never true   Transportation Needs: No Transportation Needs (9/16/2024)    PRAPARE - Transportation     Lack of Transportation (Medical): No     Lack of Transportation (Non-Medical): No   Housing Stability: Low Risk  (9/16/2024)    Housing Stability Vital Sign     Unable to Pay  "for Housing in the Last Year: No     Number of Times Moved in the Last Year: 0     Homeless in the Last Year: No   Utilities: Not At Risk (9/16/2024)    [facility] Utilities     Threatened with loss of utilities: No     No results found.    Objective     /78 (BP Location: Left arm, Patient Position: Sitting, Cuff Size: Standard)   Pulse 73   Temp (!) 97.3 °F (36.3 °C) (Tympanic)   Ht 4' 10\" (1.473 m)   Wt 74.8 kg (164 lb 12.8 oz)   SpO2 97%   BMI 34.44 kg/m²     Physical Exam  Vitals and nursing note reviewed.   Constitutional:       General: She is not in acute distress.     Appearance: Normal appearance. She is well-developed. She is not ill-appearing, toxic-appearing or diaphoretic.   HENT:      Head: Normocephalic and atraumatic.      Nose: Nose normal.      Mouth/Throat:      Pharynx: Oropharynx is clear.   Eyes:      General:         Right eye: No discharge.         Left eye: No discharge.      Extraocular Movements: Extraocular movements intact.      Conjunctiva/sclera: Conjunctivae normal.   Cardiovascular:      Rate and Rhythm: Normal rate and regular rhythm.      Pulses: Normal pulses.      Heart sounds: Normal heart sounds.   Pulmonary:      Effort: Pulmonary effort is normal.      Breath sounds: Normal breath sounds.   Musculoskeletal:         General: No swelling, tenderness, deformity or signs of injury.      Cervical back: Normal range of motion and neck supple.      Right lower leg: No edema.      Left lower leg: No edema.   Skin:     General: Skin is warm and dry.      Capillary Refill: Capillary refill takes less than 2 seconds.      Coloration: Skin is not jaundiced or pale.      Findings: No bruising, erythema, lesion or rash.   Neurological:      General: No focal deficit present.      Mental Status: She is alert and oriented to person, place, and time.   Psychiatric:         Mood and Affect: Mood normal.         Behavior: Behavior normal.         Thought Content: Thought content normal.    "      Judgment: Judgment normal.

## 2024-09-16 NOTE — PATIENT INSTRUCTIONS
Medicare Preventive Visit Patient Instructions  Thank you for completing your Welcome to Medicare Visit or Medicare Annual Wellness Visit today. Your next wellness visit will be due in one year (9/17/2025).  The screening/preventive services that you may require over the next 5-10 years are detailed below. Some tests may not apply to you based off risk factors and/or age. Screening tests ordered at today's visit but not completed yet may show as past due. Also, please note that scanned in results may not display below.  Preventive Screenings:  Service Recommendations Previous Testing/Comments   Colorectal Cancer Screening  * Colonoscopy    * Fecal Occult Blood Test (FOBT)/Fecal Immunochemical Test (FIT)  * Fecal DNA/Cologuard Test  * Flexible Sigmoidoscopy Age: 45-75 years old   Colonoscopy: every 10 years (may be performed more frequently if at higher risk)  OR  FOBT/FIT: every 1 year  OR  Cologuard: every 3 years  OR  Sigmoidoscopy: every 5 years  Screening may be recommended earlier than age 45 if at higher risk for colorectal cancer. Also, an individualized decision between you and your healthcare provider will decide whether screening between the ages of 76-85 would be appropriate. Colonoscopy: Not on file  FOBT/FIT: Not on file  Cologuard: 08/08/2022  Sigmoidoscopy: Not on file    Screening Current     Breast Cancer Screening Age: 40+ years old  Frequency: every 1-2 years  Not required if history of left and right mastectomy Mammogram: 07/02/2024    Screening Current   Cervical Cancer Screening Between the ages of 21-29, pap smear recommended once every 3 years.   Between the ages of 30-65, can perform pap smear with HPV co-testing every 5 years.   Recommendations may differ for women with a history of total hysterectomy, cervical cancer, or abnormal pap smears in past. Pap Smear: 05/15/2024    Screening Not Indicated   Hepatitis C Screening Once for adults born between 1945 and 1965  More frequently in  patients at high risk for Hepatitis C Hep C Antibody: Not on file        Diabetes Screening 1-2 times per year if you're at risk for diabetes or have pre-diabetes Fasting glucose: 95 mg/dL (7/11/2024)  A1C: No results in last 5 years (No results in last 5 years)  Screening Current   Cholesterol Screening Once every 5 years if you don't have a lipid disorder. May order more often based on risk factors. Lipid panel: 08/09/2022    Screening Current     Other Preventive Screenings Covered by Medicare:  Abdominal Aortic Aneurysm (AAA) Screening: covered once if your at risk. You're considered to be at risk if you have a family history of AAA.  Lung Cancer Screening: covers low dose CT scan once per year if you meet all of the following conditions: (1) Age 55-77; (2) No signs or symptoms of lung cancer; (3) Current smoker or have quit smoking within the last 15 years; (4) You have a tobacco smoking history of at least 20 pack years (packs per day multiplied by number of years you smoked); (5) You get a written order from a healthcare provider.  Glaucoma Screening: covered annually if you're considered high risk: (1) You have diabetes OR (2) Family history of glaucoma OR (3)  aged 50 and older OR (4)  American aged 65 and older  Osteoporosis Screening: covered every 2 years if you meet one of the following conditions: (1) You're estrogen deficient and at risk for osteoporosis based off medical history and other findings; (2) Have a vertebral abnormality; (3) On glucocorticoid therapy for more than 3 months; (4) Have primary hyperparathyroidism; (5) On osteoporosis medications and need to assess response to drug therapy.   Last bone density test (DXA Scan): 08/18/2022.  HIV Screening: covered annually if you're between the age of 15-65. Also covered annually if you are younger than 15 and older than 65 with risk factors for HIV infection. For pregnant patients, it is covered up to 3 times per  pregnancy.    Immunizations:  Immunization Recommendations   Influenza Vaccine Annual influenza vaccination during flu season is recommended for all persons aged >= 6 months who do not have contraindications   Pneumococcal Vaccine   * Pneumococcal conjugate vaccine = PCV13 (Prevnar 13), PCV15 (Vaxneuvance), PCV20 (Prevnar 20)  * Pneumococcal polysaccharide vaccine = PPSV23 (Pneumovax) Adults 19-65 yo with certain risk factors or if 65+ yo  If never received any pneumonia vaccine: recommend Prevnar 20 (PCV20)  Give PCV20 if previously received 1 dose of PCV13 or PPSV23   Hepatitis B Vaccine 3 dose series if at intermediate or high risk (ex: diabetes, end stage renal disease, liver disease)   Respiratory syncytial virus (RSV) Vaccine - COVERED BY MEDICARE PART D  * RSVPreF3 (Arexvy) CDC recommends that adults 60 years of age and older may receive a single dose of RSV vaccine using shared clinical decision-making (SCDM)   Tetanus (Td) Vaccine - COST NOT COVERED BY MEDICARE PART B Following completion of primary series, a booster dose should be given every 10 years to maintain immunity against tetanus. Td may also be given as tetanus wound prophylaxis.   Tdap Vaccine - COST NOT COVERED BY MEDICARE PART B Recommended at least once for all adults. For pregnant patients, recommended with each pregnancy.   Shingles Vaccine (Shingrix) - COST NOT COVERED BY MEDICARE PART B  2 shot series recommended in those 19 years and older who have or will have weakened immune systems or those 50 years and older     Health Maintenance Due:      Topic Date Due   • Hepatitis C Screening  Never done   • Breast Cancer Screening: Mammogram  07/02/2025   • Colorectal Cancer Screening  08/08/2025     Immunizations Due:      Topic Date Due   • Pneumococcal Vaccine: 65+ Years (1 of 1 - PCV) Never done   • COVID-19 Vaccine (1 - 2023-24 season) Never done   • Influenza Vaccine (1) 09/01/2024     Advance Directives   What are advance directives?   Advance directives are legal documents that state your wishes and plans for medical care. These plans are made ahead of time in case you lose your ability to make decisions for yourself. Advance directives can apply to any medical decision, such as the treatments you want, and if you want to donate organs.   What are the types of advance directives?  There are many types of advance directives, and each state has rules about how to use them. You may choose a combination of any of the following:  Living will:  This is a written record of the treatment you want. You can also choose which treatments you do not want, which to limit, and which to stop at a certain time. This includes surgery, medicine, IV fluid, and tube feedings.   Durable power of  for healthcare (DPAHC):  This is a written record that states who you want to make healthcare choices for you when you are unable to make them for yourself. This person, called a proxy, is usually a family member or a friend. You may choose more than 1 proxy.  Do not resuscitate (DNR) order:  A DNR order is used in case your heart stops beating or you stop breathing. It is a request not to have certain forms of treatment, such as CPR. A DNR order may be included in other types of advance directives.  Medical directive:  This covers the care that you want if you are in a coma, near death, or unable to make decisions for yourself. You can list the treatments you want for each condition. Treatment may include pain medicine, surgery, blood transfusions, dialysis, IV or tube feedings, and a ventilator (breathing machine).  Values history:  This document has questions about your views, beliefs, and how you feel and think about life. This information can help others choose the care that you would choose.  Why are advance directives important?  An advance directive helps you control your care. Although spoken wishes may be used, it is better to have your wishes written down.  Spoken wishes can be misunderstood, or not followed. Treatments may be given even if you do not want them. An advance directive may make it easier for your family to make difficult choices about your care.   Weight Management   Why it is important to manage your weight:  Being overweight increases your risk of health conditions such as heart disease, high blood pressure, type 2 diabetes, and certain types of cancer. It can also increase your risk for osteoarthritis, sleep apnea, and other respiratory problems. Aim for a slow, steady weight loss. Even a small amount of weight loss can lower your risk of health problems.  How to lose weight safely:  A safe and healthy way to lose weight is to eat fewer calories and get regular exercise. You can lose up about 1 pound a week by decreasing the number of calories you eat by 500 calories each day.   Healthy meal plan for weight management:  A healthy meal plan includes a variety of foods, contains fewer calories, and helps you stay healthy. A healthy meal plan includes the following:  Eat whole-grain foods more often.  A healthy meal plan should contain fiber. Fiber is the part of grains, fruits, and vegetables that is not broken down by your body. Whole-grain foods are healthy and provide extra fiber in your diet. Some examples of whole-grain foods are whole-wheat breads and pastas, oatmeal, brown rice, and bulgur.  Eat a variety of vegetables every day.  Include dark, leafy greens such as spinach, kale, nicole greens, and mustard greens. Eat yellow and orange vegetables such as carrots, sweet potatoes, and winter squash.   Eat a variety of fruits every day.  Choose fresh or canned fruit (canned in its own juice or light syrup) instead of juice. Fruit juice has very little or no fiber.  Eat low-fat dairy foods.  Drink fat-free (skim) milk or 1% milk. Eat fat-free yogurt and low-fat cottage cheese. Try low-fat cheeses such as mozzarella and other reduced-fat  cheeses.  Choose meat and other protein foods that are low in fat.  Choose beans or other legumes such as split peas or lentils. Choose fish, skinless poultry (chicken or turkey), or lean cuts of red meat (beef or pork). Before you cook meat or poultry, cut off any visible fat.   Use less fat and oil.  Try baking foods instead of frying them. Add less fat, such as margarine, sour cream, regular salad dressing and mayonnaise to foods. Eat fewer high-fat foods. Some examples of high-fat foods include french fries, doughnuts, ice cream, and cakes.  Eat fewer sweets.  Limit foods and drinks that are high in sugar. This includes candy, cookies, regular soda, and sweetened drinks.  Exercise:  Exercise at least 30 minutes per day on most days of the week. Some examples of exercise include walking, biking, dancing, and swimming. You can also fit in more physical activity by taking the stairs instead of the elevator or parking farther away from stores. Ask your healthcare provider about the best exercise plan for you.      © Copyright [employer] 2018 Information is for End User's use only and may not be sold, redistributed or otherwise used for commercial purposes. All illustrations and images included in CareNotes® are the copyrighted property of [employer]M., Inc. or [employer]      Medicare Preventive Visit Patient Instructions  Thank you for completing your Welcome to Medicare Visit or Medicare Annual Wellness Visit today. Your next wellness visit will be due in one year (9/17/2025).  The screening/preventive services that you may require over the next 5-10 years are detailed below. Some tests may not apply to you based off risk factors and/or age. Screening tests ordered at today's visit but not completed yet may show as past due. Also, please note that scanned in results may not display below.  Preventive Screenings:  Service Recommendations Previous Testing/Comments   Colorectal Cancer Screening  * Colonoscopy     * Fecal Occult Blood Test (FOBT)/Fecal Immunochemical Test (FIT)  * Fecal DNA/Cologuard Test  * Flexible Sigmoidoscopy Age: 45-75 years old   Colonoscopy: every 10 years (may be performed more frequently if at higher risk)  OR  FOBT/FIT: every 1 year  OR  Cologuard: every 3 years  OR  Sigmoidoscopy: every 5 years  Screening may be recommended earlier than age 45 if at higher risk for colorectal cancer. Also, an individualized decision between you and your healthcare provider will decide whether screening between the ages of 76-85 would be appropriate. Colonoscopy: Not on file  FOBT/FIT: Not on file  Cologuard: 08/08/2022  Sigmoidoscopy: Not on file    Screening Current     Breast Cancer Screening Age: 40+ years old  Frequency: every 1-2 years  Not required if history of left and right mastectomy Mammogram: 07/02/2024    Screening Current   Cervical Cancer Screening Between the ages of 21-29, pap smear recommended once every 3 years.   Between the ages of 30-65, can perform pap smear with HPV co-testing every 5 years.   Recommendations may differ for women with a history of total hysterectomy, cervical cancer, or abnormal pap smears in past. Pap Smear: 05/15/2024    Screening Not Indicated   Hepatitis C Screening Once for adults born between 1945 and 1965  More frequently in patients at high risk for Hepatitis C Hep C Antibody: Not on file        Diabetes Screening 1-2 times per year if you're at risk for diabetes or have pre-diabetes Fasting glucose: 95 mg/dL (7/11/2024)  A1C: No results in last 5 years (No results in last 5 years)  Screening Current   Cholesterol Screening Once every 5 years if you don't have a lipid disorder. May order more often based on risk factors. Lipid panel: 08/09/2022    Screening Current     Other Preventive Screenings Covered by Medicare:  Abdominal Aortic Aneurysm (AAA) Screening: covered once if your at risk. You're considered to be at risk if you have a family history of AAA.  Lung  Cancer Screening: covers low dose CT scan once per year if you meet all of the following conditions: (1) Age 55-77; (2) No signs or symptoms of lung cancer; (3) Current smoker or have quit smoking within the last 15 years; (4) You have a tobacco smoking history of at least 20 pack years (packs per day multiplied by number of years you smoked); (5) You get a written order from a healthcare provider.  Glaucoma Screening: covered annually if you're considered high risk: (1) You have diabetes OR (2) Family history of glaucoma OR (3)  aged 50 and older OR (4)  American aged 65 and older  Osteoporosis Screening: covered every 2 years if you meet one of the following conditions: (1) You're estrogen deficient and at risk for osteoporosis based off medical history and other findings; (2) Have a vertebral abnormality; (3) On glucocorticoid therapy for more than 3 months; (4) Have primary hyperparathyroidism; (5) On osteoporosis medications and need to assess response to drug therapy.   Last bone density test (DXA Scan): 08/18/2022.  HIV Screening: covered annually if you're between the age of 15-65. Also covered annually if you are younger than 15 and older than 65 with risk factors for HIV infection. For pregnant patients, it is covered up to 3 times per pregnancy.    Immunizations:  Immunization Recommendations   Influenza Vaccine Annual influenza vaccination during flu season is recommended for all persons aged >= 6 months who do not have contraindications   Pneumococcal Vaccine   * Pneumococcal conjugate vaccine = PCV13 (Prevnar 13), PCV15 (Vaxneuvance), PCV20 (Prevnar 20)  * Pneumococcal polysaccharide vaccine = PPSV23 (Pneumovax) Adults 19-63 yo with certain risk factors or if 65+ yo  If never received any pneumonia vaccine: recommend Prevnar 20 (PCV20)  Give PCV20 if previously received 1 dose of PCV13 or PPSV23   Hepatitis B Vaccine 3 dose series if at intermediate or high risk (ex: diabetes,  end stage renal disease, liver disease)   Respiratory syncytial virus (RSV) Vaccine - COVERED BY MEDICARE PART D  * RSVPreF3 (Arexvy) CDC recommends that adults 60 years of age and older may receive a single dose of RSV vaccine using shared clinical decision-making (SCDM)   Tetanus (Td) Vaccine - COST NOT COVERED BY MEDICARE PART B Following completion of primary series, a booster dose should be given every 10 years to maintain immunity against tetanus. Td may also be given as tetanus wound prophylaxis.   Tdap Vaccine - COST NOT COVERED BY MEDICARE PART B Recommended at least once for all adults. For pregnant patients, recommended with each pregnancy.   Shingles Vaccine (Shingrix) - COST NOT COVERED BY MEDICARE PART B  2 shot series recommended in those 19 years and older who have or will have weakened immune systems or those 50 years and older     Health Maintenance Due:      Topic Date Due   • Hepatitis C Screening  Never done   • Breast Cancer Screening: Mammogram  07/02/2025   • Colorectal Cancer Screening  08/08/2025     Immunizations Due:      Topic Date Due   • Pneumococcal Vaccine: 65+ Years (1 of 1 - PCV) Never done   • COVID-19 Vaccine (1 - 2023-24 season) Never done   • Influenza Vaccine (1) 09/01/2024     Advance Directives   What are advance directives?  Advance directives are legal documents that state your wishes and plans for medical care. These plans are made ahead of time in case you lose your ability to make decisions for yourself. Advance directives can apply to any medical decision, such as the treatments you want, and if you want to donate organs.   What are the types of advance directives?  There are many types of advance directives, and each state has rules about how to use them. You may choose a combination of any of the following:  Living will:  This is a written record of the treatment you want. You can also choose which treatments you do not want, which to limit, and which to stop at a  certain time. This includes surgery, medicine, IV fluid, and tube feedings.   Durable power of  for healthcare (DPAHC):  This is a written record that states who you want to make healthcare choices for you when you are unable to make them for yourself. This person, called a proxy, is usually a family member or a friend. You may choose more than 1 proxy.  Do not resuscitate (DNR) order:  A DNR order is used in case your heart stops beating or you stop breathing. It is a request not to have certain forms of treatment, such as CPR. A DNR order may be included in other types of advance directives.  Medical directive:  This covers the care that you want if you are in a coma, near death, or unable to make decisions for yourself. You can list the treatments you want for each condition. Treatment may include pain medicine, surgery, blood transfusions, dialysis, IV or tube feedings, and a ventilator (breathing machine).  Values history:  This document has questions about your views, beliefs, and how you feel and think about life. This information can help others choose the care that you would choose.  Why are advance directives important?  An advance directive helps you control your care. Although spoken wishes may be used, it is better to have your wishes written down. Spoken wishes can be misunderstood, or not followed. Treatments may be given even if you do not want them. An advance directive may make it easier for your family to make difficult choices about your care.   Weight Management   Why it is important to manage your weight:  Being overweight increases your risk of health conditions such as heart disease, high blood pressure, type 2 diabetes, and certain types of cancer. It can also increase your risk for osteoarthritis, sleep apnea, and other respiratory problems. Aim for a slow, steady weight loss. Even a small amount of weight loss can lower your risk of health problems.  How to lose weight safely:  A  safe and healthy way to lose weight is to eat fewer calories and get regular exercise. You can lose up about 1 pound a week by decreasing the number of calories you eat by 500 calories each day.   Healthy meal plan for weight management:  A healthy meal plan includes a variety of foods, contains fewer calories, and helps you stay healthy. A healthy meal plan includes the following:  Eat whole-grain foods more often.  A healthy meal plan should contain fiber. Fiber is the part of grains, fruits, and vegetables that is not broken down by your body. Whole-grain foods are healthy and provide extra fiber in your diet. Some examples of whole-grain foods are whole-wheat breads and pastas, oatmeal, brown rice, and bulgur.  Eat a variety of vegetables every day.  Include dark, leafy greens such as spinach, kale, nicole greens, and mustard greens. Eat yellow and orange vegetables such as carrots, sweet potatoes, and winter squash.   Eat a variety of fruits every day.  Choose fresh or canned fruit (canned in its own juice or light syrup) instead of juice. Fruit juice has very little or no fiber.  Eat low-fat dairy foods.  Drink fat-free (skim) milk or 1% milk. Eat fat-free yogurt and low-fat cottage cheese. Try low-fat cheeses such as mozzarella and other reduced-fat cheeses.  Choose meat and other protein foods that are low in fat.  Choose beans or other legumes such as split peas or lentils. Choose fish, skinless poultry (chicken or turkey), or lean cuts of red meat (beef or pork). Before you cook meat or poultry, cut off any visible fat.   Use less fat and oil.  Try baking foods instead of frying them. Add less fat, such as margarine, sour cream, regular salad dressing and mayonnaise to foods. Eat fewer high-fat foods. Some examples of high-fat foods include french fries, doughnuts, ice cream, and cakes.  Eat fewer sweets.  Limit foods and drinks that are high in sugar. This includes candy, cookies, regular soda, and  sweetened drinks.  Exercise:  Exercise at least 30 minutes per day on most days of the week. Some examples of exercise include walking, biking, dancing, and swimming. You can also fit in more physical activity by taking the stairs instead of the elevator or parking farther away from stores. Ask your healthcare provider about the best exercise plan for you.      © Copyright [employer] 2018 Information is for End User's use only and may not be sold, redistributed or otherwise used for commercial purposes. All illustrations and images included in CareNotes® are the copyrighted property of [employer] or [employer]

## 2025-01-14 ENCOUNTER — VBI (OUTPATIENT)
Dept: ADMINISTRATIVE | Facility: OTHER | Age: 75
End: 2025-01-14

## 2025-02-06 NOTE — TELEPHONE ENCOUNTER
02/06/25 1:27 PM     Chart reviewed for Mammogram was/were submitted to the patient's insurance.     Casandra Garcia MA   [facility]

## 2025-02-10 ENCOUNTER — VBI (OUTPATIENT)
Dept: ADMINISTRATIVE | Facility: OTHER | Age: 75
End: 2025-02-10

## 2025-02-10 NOTE — TELEPHONE ENCOUNTER
02/10/25 2:55 PM     Chart reviewed for Mammogram was/were submitted to the patient's insurance.     Jill Gipson   [facility]

## 2025-02-20 ENCOUNTER — OFFICE VISIT (OUTPATIENT)
Dept: GYNECOLOGIC ONCOLOGY | Facility: CLINIC | Age: 75
End: 2025-02-20
Payer: MEDICARE

## 2025-02-20 VITALS
BODY MASS INDEX: 35.05 KG/M2 | RESPIRATION RATE: 18 BRPM | SYSTOLIC BLOOD PRESSURE: 130 MMHG | OXYGEN SATURATION: 97 % | HEART RATE: 81 BPM | HEIGHT: 58 IN | TEMPERATURE: 98.1 F | DIASTOLIC BLOOD PRESSURE: 84 MMHG | WEIGHT: 167 LBS

## 2025-02-20 DIAGNOSIS — D07.2 VAIN III (VAGINAL INTRAEPITHELIAL NEOPLASIA GRADE III): Primary | ICD-10-CM

## 2025-02-20 PROCEDURE — 99213 OFFICE O/P EST LOW 20 MIN: CPT | Performed by: OBSTETRICS & GYNECOLOGY

## 2025-02-20 PROCEDURE — 88175 CYTOPATH C/V AUTO FLUID REDO: CPT | Performed by: PATHOLOGY

## 2025-02-20 NOTE — PROGRESS NOTES
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  She is status posttreatment with laser ablation.  She has healed well.  Routine Pap smears were started today.  She will follow-up in 6 months for second of 3 planned screening Pap smears.         Relevant Orders    Liquid-based pap, diagnostic         CHIEF COMPLAINT: Surveillance of VA IN 3      Patient ID: Jenny Gipson is a 74 y.o. female  Patient is a very pleasant 74-year-old female with a history of V AIn 3 status post laser ablation over 6 months ago.  She presents today in follow-up.  She is without complaint.Today, the patient is doing well.  She denies significant abdominal pain, pelvic pain, nausea, vomiting, constipation, diarrhea, fevers, chills, or vaginal bleeding.           The following portions of the patient's history were reviewed and updated as appropriate: allergies, current medications, past family history, past medical history, past social history, past surgical history, and problem list.    Review of Systems   Constitutional: Negative.    HENT: Negative.     Eyes: Negative.    Respiratory: Negative.     Cardiovascular: Negative.    Gastrointestinal: Negative.    Endocrine: Negative.    Genitourinary: Negative.    Musculoskeletal:  Positive for arthralgias.        Right hip and back pain   Skin: Negative.    Neurological: Negative.    Hematological: Negative.    Psychiatric/Behavioral: Negative.         Current Outpatient Medications   Medication Sig Dispense Refill    acetaminophen (TYLENOL) 650 mg CR tablet Take 1 tablet (650 mg total) by mouth every 6 (six) hours as needed for mild pain (Patient not taking: Reported on 2/20/2025)      Cholecalciferol (VITAMIN D3) 1,000 units tablet Take 1,000 Units by mouth daily (Patient not taking: Reported on 9/16/2024)      ibuprofen (MOTRIN) 600 mg tablet Take 1 tablet (600 mg total)  "by mouth every 6 (six) hours as needed for mild pain (Patient not taking: Reported on 9/16/2024)       No current facility-administered medications for this visit.           Objective:    Blood pressure 130/84, pulse 81, temperature 98.1 °F (36.7 °C), temperature source Temporal, resp. rate 18, height 4' 10\" (1.473 m), weight 75.8 kg (167 lb), SpO2 97%, not currently breastfeeding.  Body mass index is 34.9 kg/m².  Body surface area is 1.69 meters squared.    Physical Exam  Constitutional:       Appearance: She is well-developed.   HENT:      Head: Normocephalic and atraumatic.   Neck:      Thyroid: No thyromegaly.   Cardiovascular:      Rate and Rhythm: Normal rate and regular rhythm.      Heart sounds: Normal heart sounds.   Pulmonary:      Effort: Pulmonary effort is normal.      Breath sounds: Normal breath sounds.   Abdominal:      General: Bowel sounds are normal.      Palpations: Abdomen is soft.      Comments: Well healed laparoscopic incisions.   Genitourinary:     Comments: -Normal external female genitalia, normal Barth[place]'s and [place]'s glands                  -Normal midline urethral meatus. No lesions notes                  -Bladder without fullness mass or tenderness                  -Vagina without lesion or discharge No significant cystocele or rectocele noted                  -Cervix surgically absent                  -Uterus surgically absent                  -Adnexae surgically absent                  - Anus without fissure of lesion    Musculoskeletal:         General: Normal range of motion.      Cervical back: Normal range of motion and neck supple.   Lymphadenopathy:      Cervical: No cervical adenopathy.   Skin:     General: Skin is warm and dry.   Neurological:      Mental Status: She is alert and oriented to person, place, and time.   Psychiatric:         Behavior: Behavior normal.         No results found for: \"\"  Lab Results   Component Value Date    K 4.2 07/11/2024     " "07/11/2024    CO2 28 07/11/2024    BUN 14 07/11/2024    CREATININE 0.75 07/11/2024    GLUF 95 07/11/2024    CALCIUM 9.6 07/11/2024    AST 20 04/22/2024    ALT 17 04/22/2024    ALKPHOS 83 04/22/2024    EGFR 80 07/11/2024     Lab Results   Component Value Date    WBC 5.46 07/11/2024    HGB 15.0 07/11/2024    HCT 46.2 (H) 07/11/2024    MCV 96 07/11/2024     07/11/2024     Lab Results   Component Value Date    NEUTROABS 4.28 04/22/2024        Trend:  No results found for: \"\"              "

## 2025-02-20 NOTE — ASSESSMENT & PLAN NOTE
Patient has a history of VAIN 3.  She is status posttreatment with laser ablation.  She has healed well.  Routine Pap smears were started today.  She will follow-up in 6 months for second of 3 planned screening Pap smears.

## 2025-02-27 ENCOUNTER — RESULTS FOLLOW-UP (OUTPATIENT)
Dept: OTHER | Facility: HOSPITAL | Age: 75
End: 2025-02-27

## 2025-02-27 LAB
LAB AP GYN PRIMARY INTERPRETATION: ABNORMAL
Lab: ABNORMAL
PATH INTERP SPEC-IMP: ABNORMAL

## 2025-02-27 PROCEDURE — 88141 CYTOPATH C/V INTERPRET: CPT | Performed by: PATHOLOGY

## 2025-03-17 ENCOUNTER — OFFICE VISIT (OUTPATIENT)
Dept: GYNECOLOGIC ONCOLOGY | Facility: CLINIC | Age: 75
End: 2025-03-17
Payer: MEDICARE

## 2025-03-17 VITALS
BODY MASS INDEX: 34.43 KG/M2 | RESPIRATION RATE: 18 BRPM | HEIGHT: 58 IN | OXYGEN SATURATION: 96 % | HEART RATE: 88 BPM | WEIGHT: 164 LBS | SYSTOLIC BLOOD PRESSURE: 128 MMHG | TEMPERATURE: 97.6 F | DIASTOLIC BLOOD PRESSURE: 64 MMHG

## 2025-03-17 DIAGNOSIS — R87.621 ATYPICAL SQUAMOUS CELLS CANNOT EXCLUDE HIGH GRADE SQUAMOUS INTRAEPITHELIAL LESION ON CYTOLOGIC SMEAR OF VAGINA (ASC-H): ICD-10-CM

## 2025-03-17 DIAGNOSIS — D07.2 VAIN III (VAGINAL INTRAEPITHELIAL NEOPLASIA GRADE III): Primary | ICD-10-CM

## 2025-03-17 PROCEDURE — 99213 OFFICE O/P EST LOW 20 MIN: CPT | Performed by: OBSTETRICS & GYNECOLOGY

## 2025-03-17 PROCEDURE — 57420 EXAM OF VAGINA W/SCOPE: CPT | Performed by: OBSTETRICS & GYNECOLOGY

## 2025-03-17 NOTE — ASSESSMENT & PLAN NOTE
History of VA IN 3.  No evidence of recurrence of disease at this time.  Follow-up 6 months

## 2025-03-17 NOTE — ASSESSMENT & PLAN NOTE
Patient has a history of LUIS ENRIQUE 3.  She has undergone laser ablation.  Follow-up Pap smear revealed ASC-H.  Colposcopy was unremarkable today.  Patient will follow-up in 6 months for repeat Pap smear.

## 2025-03-17 NOTE — PROGRESS NOTES
"Name: Jenny Gipson      : 1950      MRN: [id number]  Encounter Provider: Rogelio Pedro MD  Encounter Date: 3/17/2025   Encounter department: [facility] [place]  :  Assessment & Plan  Atypical squamous cells cannot exclude high grade squamous intraepithelial lesion on cytologic smear of vagina (ASC-H)  Patient has a history of LUIS ENRIQUE 3.  She has undergone laser ablation.  Follow-up Pap smear revealed ASC-H.  Colposcopy was unremarkable today.  Patient will follow-up in 6 months for repeat Pap smear.         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  No evidence of recurrence of disease at this time.  Follow-up 6 months                 History of Present Illness   Reason for Visit / CC: Pap smear ASCUS H history of vaginal dysplasia   Jenny Gipson is a 74 y.o. female   Patient is a very pleasant 74-year-old female with a history of high-grade dysplasia of the vagina.  She underwent vaginal laser ablation approximately 6 months ago.  First posttreatment Pap smear revealed ASCUS H.  She presents today for colposcopy.    Today, the patient is doing well.  She denies significant abdominal pain, pelvic pain, nausea, vomiting, constipation, diarrhea, fevers, chills, or vaginal bleeding.         Pertinent Medical History            Review of Systems   Constitutional: Negative.    HENT: Negative.     Eyes: Negative.    Respiratory: Negative.     Cardiovascular: Negative.    Gastrointestinal: Negative.    Endocrine: Negative.    Genitourinary: Negative.    Musculoskeletal: Negative.    Skin: Negative.    Neurological: Negative.    Hematological: Negative.    Psychiatric/Behavioral: Negative.      A complete review of systems is negative other than that noted above in the HPI.       Objective   /64 (BP Location: Left arm, Patient Position: Sitting, Cuff Size: Large)   Pulse 88   Temp 97.6 °F (36.4 °C) (Temporal)   Resp 18   Ht 4' 10\" (1.473 m)  " " Wt 74.4 kg (164 lb)   SpO2 96%   BMI 34.28 kg/m²     Body mass index is 34.28 kg/m².  Pain Screening:  Pain Score: 0-No pain  ECOG   0  Physical Exam  Constitutional:       Appearance: She is well-developed.   HENT:      Head: Normocephalic and atraumatic.   Neck:      Thyroid: No thyromegaly.   Cardiovascular:      Rate and Rhythm: Normal rate and regular rhythm.      Heart sounds: Normal heart sounds.   Pulmonary:      Effort: Pulmonary effort is normal.      Breath sounds: Normal breath sounds.   Abdominal:      General: Bowel sounds are normal.      Palpations: Abdomen is soft.      Comments: Well healed laparoscopic incisions.   Genitourinary:     Comments: -Normal external female genitalia, normal Bartholin's and [place]'s glands                  -Normal midline urethral meatus. No lesions notes                  -Bladder without fullness mass or tenderness                  -Vagina without lesion or discharge No significant cystocele or rectocele noted                  -Cervix surgically absent                  -Uterus surgically absent                  -Adnexae surgically absent                  - Anus without fissure of lesion    Musculoskeletal:         General: Normal range of motion.      Cervical back: Normal range of motion and neck supple.   Lymphadenopathy:      Cervical: No cervical adenopathy.   Skin:     General: Skin is warm and dry.   Neurological:      Mental Status: She is alert and oriented to person, place, and time.   Psychiatric:         Behavior: Behavior normal.          Labs: I have reviewed pertinent labs.   No results found for: \"\"  Lab Results   Component Value Date/Time    Potassium 4.2 07/11/2024 10:14 AM    Chloride 106 07/11/2024 10:14 AM    CO2 28 07/11/2024 10:14 AM    BUN 14 07/11/2024 10:14 AM    Creatinine 0.75 07/11/2024 10:14 AM    Glucose, Fasting 95 07/11/2024 10:14 AM    Calcium 9.6 07/11/2024 10:14 AM    eGFR 80 07/11/2024 10:14 AM     Lab Results   Component " "Value Date/Time    WBC 5.46 07/11/2024 10:14 AM    Hemoglobin 15.0 07/11/2024 10:14 AM    Hematocrit 46.2 (H) 07/11/2024 10:14 AM    MCV 96 07/11/2024 10:14 AM    Platelets 199 07/11/2024 10:14 AM     No results found for: \"NEUTROABS\"    Colposcopy    Date/Time: 3/17/2025 8:15 AM    Performed by: Rogelio Pedro MD  Authorized by: Rogelio Pedro MD    Other Assisting Provider: No    Verbal consent obtained?: Yes    Risks and benefits: Risks, benefits and alternatives were discussed    Consent given by:  Patient  Patient identity confirmed:  Verbally with patient  Indication:     Indication:  ASC-H  Procedure:     Procedure: Colposcopy of vagina only      Under satisfactory analgesia the patient was prepped and draped in the dorsal lithotomy position: yes      Dustin speculum was placed in the vagina: yes    Post-procedure:     Impression comment:  Normal    Patient tolerance of procedure:  Tolerated well, no immediate complications  Comments:      Colposcopy of the entire vagina was performed.  Neovascularity at the vaginal cuff was identified.  No acetowhite changes were noted.  And no neovascularity consistent with dysplasia was noted.  Overall this was a normal findings.       Radiology Results Review : No pertinent imaging studies reviewed.  Other Study Results Review : Pathology reports reviewed.      "

## 2025-03-21 ENCOUNTER — OFFICE VISIT (OUTPATIENT)
Dept: OBGYN CLINIC | Facility: CLINIC | Age: 75
End: 2025-03-21
Payer: MEDICARE

## 2025-03-21 VITALS — HEIGHT: 58 IN | WEIGHT: 164 LBS | BODY MASS INDEX: 34.43 KG/M2

## 2025-03-21 DIAGNOSIS — M54.41 CHRONIC LOW BACK PAIN WITH BILATERAL SCIATICA, UNSPECIFIED BACK PAIN LATERALITY: Primary | ICD-10-CM

## 2025-03-21 DIAGNOSIS — M54.42 CHRONIC LOW BACK PAIN WITH BILATERAL SCIATICA, UNSPECIFIED BACK PAIN LATERALITY: Primary | ICD-10-CM

## 2025-03-21 DIAGNOSIS — G89.29 CHRONIC LOW BACK PAIN WITH BILATERAL SCIATICA, UNSPECIFIED BACK PAIN LATERALITY: Primary | ICD-10-CM

## 2025-03-21 PROCEDURE — 99203 OFFICE O/P NEW LOW 30 MIN: CPT | Performed by: ORTHOPAEDIC SURGERY

## 2025-03-21 NOTE — PROGRESS NOTES
Assessment & Plan  Chronic low back pain with bilateral sciatica, unspecified back pain laterality    Orders:    XR spine lumbar 2 or 3 views injury; Future    XR pelvis ap only 1 or 2 vw; Future    Ambulatory Referral to Physical Therapy; Future  Reviewed physical exam and imaging with the patient at time of visit. Imaging obtained at time of visit show degeneration of the lumbar spine. Discussed with the patient that her radicular pain is likely a result of the lumbar degenerative disc disease. Discussed treatment options with the patient at time of visit including further imaging, pain management referrals, and physical therapy. The patient elected for physical therapy, she was provided a prescription at time of visit. The patient will follow-up in 6-8 weeks for re-evaluation. The patient expresses understanding and is in agreement with today's treatment plan.      The patient is showing evidence of lumbar radiculopathy.  She does not want to get an MRI.  She does not want to have pain management.  She wants to try therapy only.  The referral was made.  Return back 2 to 3 months for evaluation    Subjective:   Patient ID: Jenny Gipson  1950     HPI  Patient is a 74 y.o. female who presents for initial evaluation of her lower back. The patient presents with bilateral leg pain and burning. She states that the pain begins in the posterior aspect of her hip and travels down her leg, past her knee. She states that the pain is worse with prolonged walking activities while working in the [employer]. The patient has a previous history of low back pain, where she received previous injections.     The following portions of the patient's history were reviewed and updated as appropriate:  Past medical history, past surgical history, Family history, social history, current medications and allergies    Past Medical History:   Diagnosis Date    Right leg DVT (HCC)        Past Surgical History:   Procedure Laterality Date     APPENDECTOMY      BREAST BIOPSY Left 02/14/2023    intraductal papiloma    COLONOSCOPY      HYSTERECTOMY      ND DESTRUCTION VAGINAL LESIONS SIMPLE N/A 8/2/2024    Procedure: LASER ABLATION OF UPPER VAGINA, EXAM UNDER ANESTHESIA;  Surgeon: Rogelio Pedro MD;  Location: [facility];  Service: Gynecology Oncology    US GUIDED BREAST BIOPSY LEFT COMPLETE Left 02/14/2023       Family History   Problem Relation Age of Onset    No Known Problems Mother     No Known Problems Father     Breast cancer Sister 69       Social History     Socioeconomic History    Marital status: Single     Spouse name: None    Number of children: None    Years of education: None    Highest education level: None   Occupational History    None   Tobacco Use    Smoking status: Never    Smokeless tobacco: Never   Vaping Use    Vaping status: Never Used   Substance and Sexual Activity    Alcohol use: Yes     Alcohol/week: 1.0 standard drink of alcohol     Types: 1 Standard drinks or equivalent per week    Drug use: Never    Sexual activity: Not Currently     Partners: Male   Other Topics Concern    None   Social History Narrative    None     Social Drivers of Health     Financial Resource Strain: Low Risk  (9/11/2023)    Overall Financial Resource Strain (CARDIA)     Difficulty of Paying Living Expenses: Not hard at all   Food Insecurity: No Food Insecurity (9/16/2024)    Nursing - Inadequate Food Risk Classification     Worried About Running Out of Food in the Last Year: Never true     Ran Out of Food in the Last Year: Never true     Ran Out of Food in the Last Year: Not on file   Transportation Needs: No Transportation Needs (9/16/2024)    PRAPARE - Transportation     Lack of Transportation (Medical): No     Lack of Transportation (Non-Medical): No   Physical Activity: Not on file   Stress: Not on file   Social Connections: Not on file   Intimate Partner Violence: Not on file   Housing Stability: Low Risk  (9/16/2024)    Housing Stability  "Vital Sign     Unable to Pay for Housing in the Last Year: No     Number of Times Moved in the Last Year: 0     Homeless in the Last Year: No         Current Outpatient Medications:     Cholecalciferol (VITAMIN D3) 1,000 units tablet, Take 1,000 Units by mouth daily, Disp: , Rfl:     Allergies   Allergen Reactions    Sulfamethoxazole-Trimethoprim Hives    Penicillins Rash     \"fast heart beat\"       Review of Systems   Constitutional:  Negative for chills, fever and unexpected weight change.   HENT:  Negative for hearing loss, nosebleeds and sore throat.    Eyes:  Negative for pain, redness and visual disturbance.   Respiratory:  Negative for cough, shortness of breath and wheezing.    Cardiovascular:  Negative for chest pain, palpitations and leg swelling.   Gastrointestinal:  Negative for abdominal pain, nausea and vomiting.   Endocrine: Negative for polydipsia and polyuria.   Genitourinary:  Negative for dysuria and hematuria.   Skin:  Negative for rash and wound.   Neurological:  Negative for dizziness, numbness and headaches.   Psychiatric/Behavioral:  Negative for decreased concentration and suicidal ideas. The patient is not nervous/anxious.    All other systems reviewed and are negative.       Objective:  Ht 4' 10\" (1.473 m)   Wt 74.4 kg (164 lb)   BMI 34.28 kg/m²     Ortho Exam  Lumbar Spine  Patient presents with no obvious anatomical deformity  Ambulates with steady gait pattern  Uses no assistive device  No tenderness over lumbar midline  No tenderness over paraspinal musculature  No tenderness over greater trochanter/trochanteric bursa  5/5 Hip Abd and Add  5/5 Knee Flexion and Extension  5/5 Strength Ankle DF and PF  - Straight Leg Raise Test  - sitting root sign (Slump/Tripod sign)  L2-S1 dermatomes and myotomes intact  2+ TP and DP pulses with brisk capillary refill to the toes  Sural, saphenous, tibial, superficial and deep peroneal motor and sensory distributions intact  Sensation to light touch " intact distally      Physical Exam  HENT:      Head: Normocephalic and atraumatic.      Nose: Nose normal.   Eyes:      Conjunctiva/sclera: Conjunctivae normal.   Cardiovascular:      Rate and Rhythm: Normal rate.   Pulmonary:      Effort: Pulmonary effort is normal.   Musculoskeletal:      Cervical back: Neck supple.   Skin:     General: Skin is warm and dry.      Capillary Refill: Capillary refill takes less than 2 seconds.   Neurological:      Mental Status: She is alert and oriented to person, place, and time.   Psychiatric:         Mood and Affect: Mood normal.         Behavior: Behavior normal.          Diagnostic Test Review:  X-Ray of lumbar spine and pelvis obtained on 3/21/2025 were reviewed and demonstrate degeneration of the lumbar spine.      Procedures   None performed.      Scribe Attestation      I,:  Chase Brian am acting as a scribe while in the presence of the attending physician.:       I,:  Spenser Hinson DO personally performed the services described in this documentation    as scribed in my presence.:

## 2025-03-23 NOTE — PROGRESS NOTES
PT Evaluation     Today's date: 3/25/2025  Patient name: Jenny Gipson  : 1950  MRN: [id number]  Referring provider: Spenser Hinson DO  Dx:   Encounter Diagnosis     ICD-10-CM    1. Chronic low back pain with bilateral sciatica, unspecified back pain laterality  M54.41 Ambulatory Referral to Physical Therapy    G89.29     M54.42                      Assessment  Impairments: abnormal or restricted ROM, activity intolerance, impaired physical strength, lacks appropriate home exercise program, pain with function and poor posture   Functional limitations: prolonged standing, prolonged walking, difficulty bending/stooping, difficulty lifting/carrying objects, walking on unlevel surfaces, difficulty squatting/kneeling, difficulty stair-climbing, and difficulty transferring from low surfaces  Symptom irritability: moderate    Assessment details: Jenny Gipson is a 74 y.o. female with a history of R LE DVT, BMI>30, GERD that presents for a moderate complexity physical therapy initial evaluation.  The patient demonstrates signs and symptoms consistent with chronic LBP with B/L sciatica.  During the examination the patient demonstrated decreased core/LE strength, decreased lumbar ROM, activity intolerance, and lumbar/ B/L LE pain.  The patient's impairments are causing the following functional limitations: difficulty with prolonged standing, prolonged walking, difficulty bending/stooping, difficulty lifting/carrying objects, walking on unlevel surfaces, difficulty squatting/kneeling, difficulty stair-climbing, and difficulty transferring from low surfaces.  The patient's clinical presentation is evolving due to a number of participation restrictions, significant medial history, and functional limitation (FOTO 48% function).  The patient will benefit from skilled PT services to address impairments, work towards goals, and restore PLOF.      Understanding of Dx/Px/POC: good     Prognosis: good  Prognosis details:  "Positive prognostic indicators include: positive attitude toward recovery, good understanding of diagnosis/treatment plan, and absence of observed red flags.      Negative prognostic indicators include: Significant medical Hx, chronicity of condition, fear avoidance behavior, central sensitization, no symptom change with movement.      Goals  STG: Achieve in 4-6 weeks  1.  Decrease lumbar pain at worst by 50% to improve activity tolerance for self/care and leisure activities.  2.  Improve core/LE strength by 1/2 muscle grade to improve ability to change body positions without difficulty.  3.  Improve lumbar ROM to \" minimal restriction\" to facilitate normal movement patterns for ADL's/work tasks.    LTG: Achieve in 8-12 weeks  1.  Patient's FOTO score improve to > 55% to indicate a return to normal functioning.  2.  Patient tolerate walking without LE pain to achieve patient specific goal.  3.  Patient achieve independence with performing home exercise plan.         Plan  Patient would benefit from: skilled physical therapy  Planned modality interventions: cryotherapy, thermotherapy: hydrocollator packs and traction    Planned therapy interventions: joint mobilization, manual therapy, neuromuscular re-education, patient education, postural training, self care, strengthening, stretching, therapeutic activities, therapeutic exercise, home exercise program, abdominal trunk stabilization, balance, body mechanics training and IASTM    Frequency: 1-3x/wk.  Duration in weeks: 12  Plan of Care beginning date: 3/25/2025  Plan of Care expiration date: 6/24/2025  Treatment plan discussed with: PTA and patient  Plan details: RE-ASSESS 1X/MONTH        Subjective Evaluation    History of Present Illness  Mechanism of injury: Jenny Gipson is a 74 y.o. female that presents to outpatient physical therapy with complaints of LBP and pain radiating into B/L LE L>R.  The patient reports onset of pain 3 months ago when walking prolonged " "distances.  The patient works [employer] and walks up to 10 miles a night.  The patient feels \"burning/stinging\" pain at her L>R posterior thighs which causes the patient to have difficulty walking.  The patient then has pain when sitting prolonged and then standing up.  The patient's main goal for physical therapy is to get rid of the leg pain.     Patient Goals  Patient goals for therapy: decreased pain, increased motion, increased strength, independence with ADLs/IADLs and return to sport/leisure activities  Patient goal: get rid of the leg pain.  Pain  Current pain rating: 3  At best pain ratin  At worst pain rating: 10  Location: L posterior hip; L anterior hip; pain B/L posterior thighs  Quality: burning (stinging)    Treatments  Current treatment: physical therapy        Objective     Concurrent Complaints  Positive for disturbed sleep and history of cancer. Negative for night pain, bladder dysfunction, bowel dysfunction, saddle (S4) numbness, history of trauma and infection    Static Posture     Shoulders  Rounded.    Thoracic Spine  Hyperkyphosis.    Lumbar Spine   Flattened.     Postural Observations  Seated posture: poor  Standing posture: poor  Correction of posture: makes symptoms worse      Neurological Testing     Sensation     Lumbar   Left   Intact: light touch    Right   Intact: light touch    Reflexes   Left   Patellar (L4): normal (2+)  Achilles (S1): normal (2+)  Babinski sign: negative    Right   Patellar (L4): normal (2+)  Achilles (S1): normal (2+)  Babinski sign: negative  Mechanical Assessment    Cervical      Thoracic      Lumbar    Standing extension: repeated movements  Pain location: centralized  Pain intensity: better  Pain level: abolished    Strength/Myotome Testing     Left Hip   Planes of Motion   Flexion: 4- (concordant pain)  Abduction: 4  Adduction: 4-    Right Hip   Planes of Motion   Flexion: 4  Abduction: 4  Adduction: 4-    Left Knee   Flexion: 4  Extension: 4+    Right Knee "   Flexion: 4  Extension: 4+    Left Ankle/Foot   Dorsiflexion: 5  Plantar flexion: 5  Great toe extension: 5    Right Ankle/Foot   Dorsiflexion: 5  Plantar flexion: 5  Great toe extension: 5    Muscle Activation     Additional Muscle Activation Details  Decreased TrA, multifidus, gluteal activation with transfers / position changes / dynamic movement      Tests     Lumbar     Left   Negative slump test.     Right   Negative slump test.     Additional Tests Details  FOTO: 48% ( predicted 48%)               Precautions: Hx DVT R LE  RE-EVAL:4/22  Access Code [email or web address]   Specialty Daily Treatment Diary     Manual  3/25       Lumbar P/A                Manual nerve glides        Piriformis Stretch B/L        Prone Quad stretch            Therapeutic Exercise 3/25       Treadmill Ambulation for endurance        UBE Stand ALT FWD/BACK for core/posture strength                                Bridges        Self 90-90 hamstring stretch B/L        Prone press ups        Standing back extensions 2x12  BETTER       Hip sags                        Stand Hip ABD+EXT                Neuro Re-ed        Core brace        kegels        Multifidi        Glute sets        Quad DLS UE  LE  UE+LE        Quad knee lifts        Clam shells        MTP/LTP/ Anti-rotations        Towel roll education, Posture correction; movement precautions DONE AD x 10 mins       Lift/chop T-band        SLB        Posture corrections seated                Sidestepping        Heel toe Amb                        Therapeutic Activity        Steps ups fwd/side        Crate carry        Crate lifts 3 levels        Lunges        Chair squats            Modalities        MHP/CP to L/B                               The patient was given a new home exercise plan with instruction, pictures, and verbal feedback.  The patient accepts and understands the new home activities.

## 2025-03-25 ENCOUNTER — EVALUATION (OUTPATIENT)
Dept: PHYSICAL THERAPY | Facility: CLINIC | Age: 75
End: 2025-03-25
Payer: MEDICARE

## 2025-03-25 DIAGNOSIS — G89.29 CHRONIC LOW BACK PAIN WITH BILATERAL SCIATICA, UNSPECIFIED BACK PAIN LATERALITY: Primary | ICD-10-CM

## 2025-03-25 DIAGNOSIS — M54.41 CHRONIC LOW BACK PAIN WITH BILATERAL SCIATICA, UNSPECIFIED BACK PAIN LATERALITY: Primary | ICD-10-CM

## 2025-03-25 DIAGNOSIS — M54.42 CHRONIC LOW BACK PAIN WITH BILATERAL SCIATICA, UNSPECIFIED BACK PAIN LATERALITY: Primary | ICD-10-CM

## 2025-03-25 PROCEDURE — 97162 PT EVAL MOD COMPLEX 30 MIN: CPT | Performed by: PHYSICAL THERAPIST

## 2025-03-25 PROCEDURE — 97110 THERAPEUTIC EXERCISES: CPT | Performed by: PHYSICAL THERAPIST

## 2025-03-25 PROCEDURE — 97112 NEUROMUSCULAR REEDUCATION: CPT | Performed by: PHYSICAL THERAPIST

## 2025-03-27 NOTE — PROGRESS NOTES
Daily Note     Today's date: 3/27/2025  Patient name: Jenny Gipson  : 1950  MRN: [id number]  Referring provider: Spenser Hinson DO  Dx:   Encounter Diagnosis     ICD-10-CM    1. Chronic low back pain with bilateral sciatica, unspecified back pain laterality  M54.41     G89.29     M54.42                      Subjective: The patient notes a significant reduction in pain at her low back and posterior thighs.  The patient notes having only 1 episode of pain while working [employer] this past Wednesday.      Objective: See treatment diary below      Assessment: The patient was very pleasant and agreeable during the session today.  The patient tolerated the exercises fairly well today.  The patient needed VC to avoid valsalva breath holding and to perform the exercises with proper form.  The patient reports feeling some mild discomfort at her B/L UT from the T-spine EXT exercise at the end of the treatment.  The patient was cued to avoid engaging this area when exercising.  The patient will benefit from continued skilled physical therapy to progress towards achieving patient centered goals.        Plan: Continue per plan of care.  Progress treatment as tolerated.       Precautions: Hx DVT R LE  RE-EVAL:  Access Code [email or web address]   Specialty Daily Treatment Diary     Manual  3/25 3/28      Lumbar P/A                Manual nerve glides        Piriformis Stretch B/L        Prone Quad stretch            Therapeutic Exercise 3/25 3/28      Treadmill Ambulation for endurance        UBE Stand ALT FWD/BACK for core/posture strength  L3 x 8 mins ALT                              Bridges        Self 90-90 hamstring stretch B/L        Prone press ups        Standing back extensions 2x12  BETTER 1x10 after UBE      Hip sags        T-spine EXT  X10 1/2 roll                Stand Hip ABD+EXT  X15 B/L              Neuro Re-ed        Core brace  X15 seated      kegels  X15 :05      Multifidi  Stand ant tilt x15      Glute sets  x10  "     Quad DLS UE  LE  UE+LE        Quad knee lifts        Clam shells        MTP/LTP/ Anti-rotations        Towel roll education, Posture correction; movement precautions DONE AD x 10 mins reviewed              SLB        Posture corrections seated  x15              Sidestepping        Heel toe Amb                CSMi  X15 ea WS \"+\"      Therapeutic Activity        Steps ups fwd/side        Crate carry        Crate lifts 3 levels        Lunges        Chair squats            Modalities        MHP/CP to L/B                                 " Pt instructed to take quinapril, and metoprolol day of surgery . Monitor BP during hospital stay.

## 2025-03-28 ENCOUNTER — OFFICE VISIT (OUTPATIENT)
Dept: PHYSICAL THERAPY | Facility: CLINIC | Age: 75
End: 2025-03-28
Payer: MEDICARE

## 2025-03-28 DIAGNOSIS — M54.41 CHRONIC LOW BACK PAIN WITH BILATERAL SCIATICA, UNSPECIFIED BACK PAIN LATERALITY: Primary | ICD-10-CM

## 2025-03-28 DIAGNOSIS — G89.29 CHRONIC LOW BACK PAIN WITH BILATERAL SCIATICA, UNSPECIFIED BACK PAIN LATERALITY: Primary | ICD-10-CM

## 2025-03-28 DIAGNOSIS — M54.42 CHRONIC LOW BACK PAIN WITH BILATERAL SCIATICA, UNSPECIFIED BACK PAIN LATERALITY: Primary | ICD-10-CM

## 2025-03-28 PROCEDURE — 97112 NEUROMUSCULAR REEDUCATION: CPT | Performed by: PHYSICAL THERAPIST

## 2025-03-28 PROCEDURE — 97110 THERAPEUTIC EXERCISES: CPT | Performed by: PHYSICAL THERAPIST

## 2025-04-04 ENCOUNTER — OFFICE VISIT (OUTPATIENT)
Dept: PHYSICAL THERAPY | Facility: CLINIC | Age: 75
End: 2025-04-04
Payer: MEDICARE

## 2025-04-04 DIAGNOSIS — M54.42 CHRONIC LOW BACK PAIN WITH BILATERAL SCIATICA, UNSPECIFIED BACK PAIN LATERALITY: Primary | ICD-10-CM

## 2025-04-04 DIAGNOSIS — M54.41 CHRONIC LOW BACK PAIN WITH BILATERAL SCIATICA, UNSPECIFIED BACK PAIN LATERALITY: Primary | ICD-10-CM

## 2025-04-04 DIAGNOSIS — G89.29 CHRONIC LOW BACK PAIN WITH BILATERAL SCIATICA, UNSPECIFIED BACK PAIN LATERALITY: Primary | ICD-10-CM

## 2025-04-04 PROCEDURE — 97110 THERAPEUTIC EXERCISES: CPT

## 2025-04-04 PROCEDURE — 97112 NEUROMUSCULAR REEDUCATION: CPT

## 2025-04-04 NOTE — PROGRESS NOTES
"Daily Note     Today's date: 2025  Patient name: Jenny Gipson  : 1950  MRN: [id number]  Referring provider: Spenser Hinson DO  Dx:   Encounter Diagnosis     ICD-10-CM    1. Chronic low back pain with bilateral sciatica, unspecified back pain laterality  M54.41     G89.29     M54.42           Start Time: 822          Subjective: Patient states her back has been better. Presently she has no pain in it. She missed last session due to stomach virus.       Objective: See treatment diary below    Patient's home exercise program updated to include additional exercises. Handout issued and explained with demonstration. She accepted exercises. Patient educated on how propping self with pillows tightens her into forward flexed potion. Encouraged her to straighten and stretch out more to loosen self.     Assessment: Tolerated treatment well. Patient would benefit from continued PT for stretching and strengthening. She was able to add exercises to her program with little difficulty. She has difficulty rolling in bed because she does not \"do it much\". Patient seemed to understand all education on new exercises. She felt ok and had no pain leaving department.      Plan: Continue per plan of care.  Progress treatment as tolerated.       Precautions: Hx DVT R LE  RE-EVAL:  Access Code [email or web address]   Specialty Daily Treatment Diary     Manual  3/25 3/28 4/     Lumbar P/A                Manual nerve glides        Piriformis Stretch B/L        Prone Quad stretch            Therapeutic Exercise 3/25 3/28 4/4     Treadmill Ambulation for endurance        UBE Stand ALT FWD/BACK for core/posture strength  L3 x 8 mins ALT L3 x8 min AlT     Snow angle        Open book                Bridges   :03 x10     Self 90-90 hamstring stretch B/L   :15x4 ea B/L     Prone press ups        Standing back extensions 2x12  BETTER 1x10 after UBE 1x10 after UBE     Hip sags        T-spine EXT  X10 1/2 roll   X10 1/2 roll             Stand " "Hip ABD+EXT  X15 B/L X15 B/L             Neuro Re-ed        Core brace  X15 seated Spine :05x10     kegels  X15 :05 :05x15     Multifidi  Stand ant tilt x15 Stand ant tilt x15  difficuly     Glute sets  x10 x15     Quad DLS UE  LE  UE+LE        Quad knee lifts        Clam shells   :03x10 ea     MTP/LTP/ Anti-rotations        Towel roll education, Posture correction; movement precautions DONE AD x 10 mins reviewed              SLB        Posture corrections seated  x15 :10x10             Sidestepping   10ft x4     Heel toe Amb   10ft x4             CSMi  X15 ea WS \"+\" X15 ea WS \"+\"     Therapeutic Activity        Steps ups fwd/side        Crate carry        Crate lifts 3 levels        Lunges        Chair squats            Modalities        MHP/CP to L/B                          Access Code: [email or web address]  URL: [email or web address]  Date: 04/04/2025  Prepared by: Shauna Mosher    Exercises  - Standing Lumbar Extension with Counter  - 8 x daily - 7 x weekly - 1 sets - 10 reps  - Standing Hip Extension with Counter Support  - 2 x daily - 7 x weekly - 2 sets - 15 reps  - Standing Anterior Pelvic Tilt  - 2 x daily - 7 x weekly - 3 sets - 10 reps  - Supine Transversus Abdominis Bracing - Hands on Stomach  - 2 x daily - 7 x weekly - 2 sets - 10 reps - 5 hold  - Seated Pelvic Floor Contraction  - 2 x daily - 7 x weekly - 1 sets - 10 reps - 10 hold  - Seated Upright Posture Correction  - 2-3 x daily - 7 x weekly - 1 sets - 10 reps - 5 hold  - Seated Thoracic Lumbar Extension  - 2-3 x daily - 7 x weekly - 1 sets - 10 reps  - Side Stepping with Counter Support  - 2 x daily - 7 x weekly - 1 sets - 4 reps  - Tandem Walking with Counter Support  - 2 x daily - 7 x weekly - 1 sets - 4 reps  - Clamshell  - 2 x daily - 7 x weekly - 1 sets - 10 reps - 3-5 sec hold  - Supine Bridge  - 2 x daily - 7 x weekly - 1 sets - 10 reps - 3-5 sec hold  - Supine Hamstring Stretch  - 2 x daily - 7 x weekly - 1 sets - 4 reps - 15 sec " hold    Patient Education  - Office Posture

## 2025-04-08 ENCOUNTER — OFFICE VISIT (OUTPATIENT)
Dept: PHYSICAL THERAPY | Facility: CLINIC | Age: 75
End: 2025-04-08
Payer: MEDICARE

## 2025-04-08 DIAGNOSIS — M54.42 CHRONIC LOW BACK PAIN WITH BILATERAL SCIATICA, UNSPECIFIED BACK PAIN LATERALITY: Primary | ICD-10-CM

## 2025-04-08 DIAGNOSIS — M54.41 CHRONIC LOW BACK PAIN WITH BILATERAL SCIATICA, UNSPECIFIED BACK PAIN LATERALITY: Primary | ICD-10-CM

## 2025-04-08 DIAGNOSIS — G89.29 CHRONIC LOW BACK PAIN WITH BILATERAL SCIATICA, UNSPECIFIED BACK PAIN LATERALITY: Primary | ICD-10-CM

## 2025-04-08 PROCEDURE — 97110 THERAPEUTIC EXERCISES: CPT

## 2025-04-08 PROCEDURE — 97112 NEUROMUSCULAR REEDUCATION: CPT

## 2025-04-08 NOTE — PROGRESS NOTES
Daily Note     Today's date: 2025  Patient name: Jenny Gipson  : 1950  MRN: [id number]  Referring provider: Spenser Hinson DO  Dx:   Encounter Diagnosis     ICD-10-CM    1. Chronic low back pain with bilateral sciatica, unspecified back pain laterality  M54.41     G89.29     M54.42           Start Time: 0807          Subjective: Patient states she has been doing well at home. She had a little pain last night in her leg from walking so much at [employer]. Presently she has no pain.       Objective: See treatment diary below    Davion's home exercise program updated to include additional exercises. Handout issued and explained with demonstration. She accepts new exercises. Red theraband issued for home use.     Assessment: Tolerated treatment well. Patient would benefit from continued PT for stretching and strengthening. She was able to add exercises to her program with little difficulty. She seemed to understand all education on new exercises. Verbal cues needed at times for proper performance of exercises. Patient felt good leaving department      Plan: Continue per plan of care.  Progress treatment as tolerated.       Precautions: Hx DVT R LE  RE-EVAL:  Access Code [email or web address]   Specialty Daily Treatment Diary     Manual  3/25 3/28 4/4 4/8    Lumbar P/A                Manual nerve glides        Piriformis Stretch B/L        Prone Quad stretch            Therapeutic Exercise 3/25 3/28 4/4 4/8    Treadmill Ambulation for endurance        UBE Stand ALT FWD/BACK for core/posture strength  L3 x 8 mins ALT L3 x8 min AlT L3 x8 min ALT    Snow angle        Open book                Bridges   :03 x10 :03x15    Self 90-90 hamstring stretch B/L   :15x4 ea B/L :15x4 ea B/L    Prone press ups        Standing back extensions 2x12  BETTER 1x10 after UBE 1x10 after UBE 1x10 after UBE    Hip sags        T-spine EXT  X10 1/2 roll   X10 1/2 roll X15 1/2 roll            Stand Hip ABD+EXT  X15 B/L X15 B/L X15 B/L           "  Neuro Re-ed        Core brace  X15 seated Supine :05x10 :05x10    kegels  X15 :05 :05x15 :05x15    Multifidi  Stand ant tilt x15 Stand ant tilt x15  difficuly Stand ant tilt x15  difficuly    Glute sets  x10 x15 x15    Quad DLS UE  LE  UE+LE        Quad knee lifts        Clam shells   :03x10 ea :03x15 ea    MTP/LTP/ Anti-rotations    MTP/LTP x10 ea RED    Towel roll education, Posture correction; movement precautions DONE AD x 10 mins reviewed              SLB        Posture corrections seated  x15 :10x10 :10x10            Sidestepping   10ft x4 10ft x4    Heel toe Amb   10ft x4 10ft x4            CSMi \"+\"  X15 ea WS \"+\" X15 ea WS \"+\" WS x15 ea  SQ x10    Therapeutic Activity        Steps ups fwd/side        Crate carry        Crate lifts 3 levels        Lunges        Chair squats            Modalities        MHP/CP to L/B                          Access Code: [email or web address]  URL: [email or web address]  Date: 04/08/2025  Prepared by: Shauna Mosher    Exercises  - Standing Lumbar Extension with Counter  - 8 x daily - 7 x weekly - 1 sets - 10 reps  - Standing Hip Extension with Counter Support  - 2 x daily - 7 x weekly - 2 sets - 15 reps  - Standing Anterior Pelvic Tilt  - 2 x daily - 7 x weekly - 3 sets - 10 reps  - Supine Transversus Abdominis Bracing - Hands on Stomach  - 2 x daily - 7 x weekly - 2 sets - 10 reps - 5 hold  - Seated Pelvic Floor Contraction  - 2 x daily - 7 x weekly - 1 sets - 10 reps - 10 hold  - Seated Upright Posture Correction  - 2-3 x daily - 7 x weekly - 1 sets - 10 reps - 5 hold  - Seated Thoracic Lumbar Extension  - 2-3 x daily - 7 x weekly - 1 sets - 10 reps  - Side Stepping with Counter Support  - 2 x daily - 7 x weekly - 1 sets - 4 reps  - Tandem Walking with Counter Support  - 2 x daily - 7 x weekly - 1 sets - 4 reps  - Clamshell  - 2 x daily - 7 x weekly - 1 sets - 10 reps - 3-5 sec hold  - Supine Bridge  - 2 x daily - 7 x weekly - 1 sets - 10 reps - 3-5 sec hold  - Supine " Hamstring Stretch  - 2 x daily - 7 x weekly - 1 sets - 4 reps - 15 sec hold  - Standing Shoulder Row with Anchored Resistance  - 2 x daily - 7 x weekly - 1 sets - 10 reps  - Shoulder extension with resistance - Neutral  - 2 x daily - 7 x weekly - 1 sets - 10 reps    Patient Education  - Office Posture

## 2025-04-11 ENCOUNTER — OFFICE VISIT (OUTPATIENT)
Dept: PHYSICAL THERAPY | Facility: CLINIC | Age: 75
End: 2025-04-11
Payer: MEDICARE

## 2025-04-11 DIAGNOSIS — M54.41 CHRONIC LOW BACK PAIN WITH BILATERAL SCIATICA, UNSPECIFIED BACK PAIN LATERALITY: Primary | ICD-10-CM

## 2025-04-11 DIAGNOSIS — M54.42 CHRONIC LOW BACK PAIN WITH BILATERAL SCIATICA, UNSPECIFIED BACK PAIN LATERALITY: Primary | ICD-10-CM

## 2025-04-11 DIAGNOSIS — G89.29 CHRONIC LOW BACK PAIN WITH BILATERAL SCIATICA, UNSPECIFIED BACK PAIN LATERALITY: Primary | ICD-10-CM

## 2025-04-11 PROCEDURE — 97110 THERAPEUTIC EXERCISES: CPT

## 2025-04-11 PROCEDURE — 97112 NEUROMUSCULAR REEDUCATION: CPT

## 2025-04-11 NOTE — PROGRESS NOTES
"Daily Note     Today's date: 2025  Patient name: eJnny Gipson  : 1950  MRN: [id number]  Referring provider: Spenser Hinson DO  Dx:   Encounter Diagnosis     ICD-10-CM    1. Chronic low back pain with bilateral sciatica, unspecified back pain laterality  M54.41     G89.29     M54.42           Start Time: 0825          Subjective: Patient states she feels \"good\" and has no pain today.       Objective: See treatment diary below    Patient's home exercise program updated to include additional exercises . Handout issued and explained with demonstration. She accepts new exercises.     Assessment: Tolerated treatment well. Patient would benefit from continued PT for stretching and strengthening. She was able to add exercises to her program with little difficulty. Verbal cues needed throughout session for proper performance of exercises. She seemed to understand all education on new exercises. Patient felt \"good\" leaving department.       Plan: Continue per plan of care.  Progress treatment as tolerated.       Precautions: Hx DVT R LE  RE-EVAL:  Access Code [email or web address]   Specialty Daily Treatment Diary     Manual  3/25 3/28 4/4 4/8 4/11   Lumbar P/A                Manual nerve glides        Piriformis Stretch B/L        Prone Quad stretch            Therapeutic Exercise 3/25 3/28 4/4 4/8 4/11   Treadmill Ambulation for endurance        UBE Stand ALT FWD/BACK for core/posture strength  L3 x 8 mins ALT L3 x8 min AlT L3 x8 min ALT L3 x8 min ALT   Snow angle     X1 miin lay   :15x3   Open book     X10 B/L           Bridges   :03 x10 :03x15 :03 x15   Self 90-90 hamstring stretch B/L   :15x4 ea B/L :15x4 ea B/L :15x4 ea B/L   Prone press ups        Standing back extensions 2x12  BETTER 1x10 after UBE 1x10 after UBE 1x10 after UBE 1x10 after UBE   Hip sags        T-spine EXT  X10 1/2 roll   X10 1/2 roll X15 1/2 roll X15 1/2 roll           Stand Hip ABD+EXT  X15 B/L X15 B/L X15 B/L X15 B/L           Neuro Re-ed   " "     Core brace  X15 seated Supine :05x10 :05x10 :05 x10   kegels  X15 :05 :05x15 :05x15 :05x10   Multifidi  Stand ant tilt x15 Stand ant tilt x15  difficuly Stand ant tilt x15  difficuly Stand ant tilt x15  difficuly   Glute sets  x10 x15 x15 x15   Quad DLS UE  LE  UE+LE        Quad knee lifts        Clam shells   :03x10 ea :03x15 ea :03 x 15 ea   MTP/LTP/ Anti-rotations    MTP/LTP x10 ea RED MTP/LTP x10 ea RED   Towel roll education, Posture correction; movement precautions DONE AD x 10 mins reviewed              SLB        Posture corrections seated  x15 :10x10 :10x10 :10x10           Sidestepping   10ft x4 10ft x4 10 ft x4   Heel toe Amb   10ft x4 10ft x4 10 ft x4           CSMi \"+\"  X15 ea WS \"+\" X15 ea WS \"+\" WS x15 ea  SQ x10 WS x15 ea  SQ x10   Therapeutic Activity        Steps ups fwd/side        Crate carry        Crate lifts 3 levels        Lunges        Chair squats            Modalities        MHP/CP to L/B                          Access Code: [email or web address]  URL: [email or web address]  Date: 04/11/2025  Prepared by: Shauna Mosher    Exercises  - Standing Lumbar Extension with Counter  - 8 x daily - 7 x weekly - 1 sets - 10 reps  - Standing Hip Extension with Counter Support  - 2 x daily - 7 x weekly - 2 sets - 15 reps  - Standing Anterior Pelvic Tilt  - 2 x daily - 7 x weekly - 3 sets - 10 reps  - Supine Transversus Abdominis Bracing - Hands on Stomach  - 2 x daily - 7 x weekly - 2 sets - 10 reps - 5 hold  - Seated Pelvic Floor Contraction  - 2 x daily - 7 x weekly - 1 sets - 10 reps - 10 hold  - Seated Upright Posture Correction  - 2-3 x daily - 7 x weekly - 1 sets - 10 reps - 5 hold  - Seated Thoracic Lumbar Extension  - 2-3 x daily - 7 x weekly - 1 sets - 10 reps  - Side Stepping with Counter Support  - 2 x daily - 7 x weekly - 1 sets - 4 reps  - Tandem Walking with Counter Support  - 2 x daily - 7 x weekly - 1 sets - 4 reps  - Debil  - 2 x daily - 7 x weekly - 1 sets - 10 reps - " 3-5 sec hold  - Supine Bridge  - 2 x daily - 7 x weekly - 1 sets - 10 reps - 3-5 sec hold  - Supine Hamstring Stretch  - 2 x daily - 7 x weekly - 1 sets - 4 reps - 15 sec hold  - Standing Shoulder Row with Anchored Resistance  - 2 x daily - 7 x weekly - 1 sets - 10 reps  - Shoulder extension with resistance - Neutral  - 2 x daily - 7 x weekly - 1 sets - 10 reps  - Sidelying Open Book Thoracic Lumbar Rotation and Extension  - 2 x daily - 7 x weekly - 1 sets - 10 reps  - Snow [place]  - 2 x daily - 7 x weekly - 1 sets - 4 reps - 15 sec hold    Patient Education  - Office Posture

## 2025-04-18 ENCOUNTER — OFFICE VISIT (OUTPATIENT)
Dept: PHYSICAL THERAPY | Facility: CLINIC | Age: 75
End: 2025-04-18
Payer: MEDICARE

## 2025-04-18 DIAGNOSIS — G89.29 CHRONIC LOW BACK PAIN WITH BILATERAL SCIATICA, UNSPECIFIED BACK PAIN LATERALITY: Primary | ICD-10-CM

## 2025-04-18 DIAGNOSIS — M54.41 CHRONIC LOW BACK PAIN WITH BILATERAL SCIATICA, UNSPECIFIED BACK PAIN LATERALITY: Primary | ICD-10-CM

## 2025-04-18 DIAGNOSIS — M54.42 CHRONIC LOW BACK PAIN WITH BILATERAL SCIATICA, UNSPECIFIED BACK PAIN LATERALITY: Primary | ICD-10-CM

## 2025-04-18 PROCEDURE — 97110 THERAPEUTIC EXERCISES: CPT

## 2025-04-18 PROCEDURE — 97112 NEUROMUSCULAR REEDUCATION: CPT

## 2025-04-18 NOTE — PROGRESS NOTES
Daily Note     Today's date: 2025  Patient name: Jenny Gipson  : 1950  MRN: [id number]  Referring provider: Spenser Hinson DO  Dx:   Encounter Diagnosis     ICD-10-CM    1. Chronic low back pain with bilateral sciatica, unspecified back pain laterality  M54.41     G89.29     M54.42           Start Time: 824          Subjective: Patient states the last time she worked [employer] she had a lot of pain down the leg that wrapped to the front on left. She could not even drive he car home because of the pain.       Objective: See treatment diary below    Reviewed body posture when performing her [employer] job and how back extensions would help with the pain. Encourage patient to work on bed rolling over the weekend.    Assessment: Tolerated treatment well. Patient would benefit from continued PT for stretching and strengthening. She needed verbal cues for proper performance of exercises. Patient has difficulty with rolling on mat. She seemed to understand all education given during session. Diandranet felt good leaving department.      Plan: Continue per plan of care.  Progress treatment as tolerated.       Precautions: Hx DVT R LE  RE-EVAL:  Access Code [email or web address]   Specialty Daily Treatment Diary     Manual     Lumbar P/A                Manual nerve glides        Piriformis Stretch B/L        Prone Quad stretch            Therapeutic Exercise    Treadmill Ambulation for endurance        UBE Stand ALT FWD/BACK for core/posture strength L3 x8 min ALT  L3 x8 min AlT L3 x8 min ALT L3 x8 min ALT   Snow angle X1 min lay   :15x3    X1 min lay   :15x3   Open book X10 B/L    X10 B/L           Bridges x  :03 x10 :03x15 :03 x15   Self 90-90 hamstring stretch B/L x  :15x4 ea B/L :15x4 ea B/L :15x4 ea B/L   Prone press ups        Standing back extensions 1x10 after UBE  1x10 after UBE 1x10 after UBE 1x10 after UBE   Hip sags        T-spine EXT X15 1/2 roll  X10 1/2 roll X15 1/2 roll X15  "1/2 roll           Stand Hip ABD+EXT X15 B/L  X15 B/L X15 B/L X15 B/L           Neuro Re-ed        Core brace x15 W/ UBE Supine :05x10 :05x10 :05 x10   kegels x15 W/UBE :05x15 :05x15 :05x10   Multifidi Stand ant tilt x15  difficuly  Stand ant tilt x15  difficuly Stand ant tilt x15  difficuly Stand ant tilt x15  difficuly   Glute sets x15  x15 x15 x15   Quad DLS UE  LE  UE+LE        Quad knee lifts        Clam shells X15 ea  :03x10 ea :03x15 ea :03 x 15 ea   MTP/LTP/ Anti-rotations MTP/LTP x15 ea RED   MTP/LTP x10 ea RED MTP/LTP x10 ea RED   Towel roll education, Posture correction; movement precautions                SLB        Posture corrections seated :10 x10  :10x10 :10x10 :10x10           Sidestepping 10ft x4  10ft x4 10ft x4 10 ft x4   Heel toe Amb 10 ft x4  10ft x4 10ft x4 10 ft x4           CSMi \"+\" WS x15 ea  SQ x10  X15 ea WS \"+\" WS x15 ea  SQ x10 WS x15 ea  SQ x10   Therapeutic Activity        Steps ups fwd/side NV       Crate carry        Crate lifts 3 levels        Lunges        Chair squats            Modalities        MHP/CP to L/B                          Access Code: [email or web address]  URL: [email or web address]  Date: 04/11/2025  Prepared by: Shauna Mosher    Exercises  - Standing Lumbar Extension with Counter  - 8 x daily - 7 x weekly - 1 sets - 10 reps  - Standing Hip Extension with Counter Support  - 2 x daily - 7 x weekly - 2 sets - 15 reps  - Standing Anterior Pelvic Tilt  - 2 x daily - 7 x weekly - 3 sets - 10 reps  - Supine Transversus Abdominis Bracing - Hands on Stomach  - 2 x daily - 7 x weekly - 2 sets - 10 reps - 5 hold  - Seated Pelvic Floor Contraction  - 2 x daily - 7 x weekly - 1 sets - 10 reps - 10 hold  - Seated Upright Posture Correction  - 2-3 x daily - 7 x weekly - 1 sets - 10 reps - 5 hold  - Seated Thoracic Lumbar Extension  - 2-3 x daily - 7 x weekly - 1 sets - 10 reps  - Side Stepping with Counter Support  - 2 x daily - 7 x weekly - 1 sets - 4 reps  - Tandem " Walking with Counter Support  - 2 x daily - 7 x weekly - 1 sets - 4 reps  - Clamshell  - 2 x daily - 7 x weekly - 1 sets - 10 reps - 3-5 sec hold  - Supine Bridge  - 2 x daily - 7 x weekly - 1 sets - 10 reps - 3-5 sec hold  - Supine Hamstring Stretch  - 2 x daily - 7 x weekly - 1 sets - 4 reps - 15 sec hold  - Standing Shoulder Row with Anchored Resistance  - 2 x daily - 7 x weekly - 1 sets - 10 reps  - Shoulder extension with resistance - Neutral  - 2 x daily - 7 x weekly - 1 sets - 10 reps  - Sidelying Open Book Thoracic Lumbar Rotation and Extension  - 2 x daily - 7 x weekly - 1 sets - 10 reps  - [place]  - 2 x daily - 7 x weekly - 1 sets - 4 reps - 15 sec hold    Patient Education  - Office Posture

## 2025-04-20 NOTE — PROGRESS NOTES
PT Re-Evaluation  and PT Discharge    Today's date: 2025  Patient name: Jenny Gipson  : 1950  MRN: [id number]  Referring provider: Spenser Hinson DO  Dx:   Encounter Diagnosis     ICD-10-CM    1. Chronic low back pain with bilateral sciatica, unspecified back pain laterality  M54.41     G89.29     M54.42                      Assessment  Functional limitations: prolonged standing, prolonged walking, difficulty bending/stooping, difficulty lifting/carrying objects, walking on unlevel surfaces, difficulty squatting/kneeling, difficulty stair-climbing, and difficulty transferring from low surfaces  Symptom irritability: low    Assessment details: Jenny Gipson is a 74 y.o. female with a history of R LE DVT, BMI>30, GERD that presents for a physical therapy RE-evaluation/ DISCHARGE.  The patient demonstrates signs and symptoms consistent with chronic LBP with B/L sciatica.  During the examination the patient demonstrated improved but impaired core/LE strength, improved lumbar ROM, improved activity tolerance, and decreased lumbar/ B/L LE pain.   The patient has made functional gains since starting therapy.  The patient is now able to walk with much less pain at the back and LE.  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  The patient has benefited from using a lumbar roll.  She notes reduced pain with prolonged sitting and standing up.  The patient is falling asleep and staying asleep without disturbance.  The patient is independent with her HEP and will be discharged from formal PT.          Understanding of Dx/Px/POC: good     Prognosis: good  Prognosis details: Positive prognostic indicators include: positive attitude toward recovery, good understanding of diagnosis/treatment plan, and absence of observed red flags.      Negative prognostic indicators include: Significant medical Hx, chronicity of condition, fear avoidance behavior, central sensitization, no symptom change with  "movement.      Goals  STG: Achieve in 4-6 weeks  1.  Decrease lumbar pain at worst by 50% to improve activity tolerance for self/care and leisure activities. MET  2.  Improve core/LE strength by 1/2 muscle grade to improve ability to change body positions without difficulty. MET  3.  Improve lumbar ROM to \" minimal restriction\" to facilitate normal movement patterns for ADL's/work tasks. PARTIALLY MET    LTG: Achieve in 8-12 weeks  1.  Patient's FOTO score improve to > 55% to indicate a return to normal functioning. MET  2.  Patient tolerate walking without LE pain to achieve patient specific goal. MET  3.  Patient achieve independence with performing home exercise plan.  MET        Plan    Treatment plan discussed with: PTA and patient  Plan details: D/C PT TO AN INDEPENDENT HEP        Subjective Evaluation    History of Present Illness  Mechanism of injury: SUBJECTIVE: 4/22/25: The patient reports an improvement in activity tolerance and B/L leg pain since starting therapy.  The patient is now able to walk with much less pain at the back and LE.  The patient is working [employer] without much difficulty.  The patient has benefited from using a lumbar roll.  She notes reduced pain with prolonged sitting and standing up.  The patient is falling asleep and staying asleep without disturbance.  The patient is independent with her HEP and will be discharged from formal PT.          INJURY HISTORY: Jenny Gipson is a 74 y.o. female that presents to outpatient physical therapy with complaints of LBP and pain radiating into B/L LE L>R.  The patient reports onset of pain 3 months ago when walking prolonged distances.  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  The patient feels \"burning/stinging\" pain at her L>R posterior thighs which causes the patient to have difficulty walking.  The patient then has pain when sitting prolonged and then standing up.  The patient's main goal for physical therapy is to get rid of " the leg pain.     Patient Goals  Patient goal: get rid of the leg pain. - MET  Pain  Current pain ratin  At best pain ratin  At worst pain rating: 3  Location: L posterior hip; L anterior hip; pain B/L posterior thighs  Quality: burning (stinging)  Progression: improved    Social Support  Lives with: adult children    Employment status: not working  Treatments  Previous treatment: physical therapy        Objective     Concurrent Complaints  Positive for history of cancer. Negative for night pain, disturbed sleep, bladder dysfunction, bowel dysfunction, saddle (S4) numbness, history of trauma and infection    Additional Special Questions  PATIENT REPORTS NO LONGER HAVING DISTURBED SLEEP DUE TO PAIN    Static Posture     Shoulders  Rounded.    Thoracic Spine  Hyperkyphosis.    Lumbar Spine   Flattened.     Postural Observations  Seated posture: fair  Standing posture: fair  Correction of posture: makes symptoms better    Additional Postural Observation Details  FAIR W/ LUMBAR ROLL    Neurological Testing     Sensation     Lumbar   Left   Intact: light touch    Right   Intact: light touch    Reflexes   Left   Patellar (L4): normal (2+)  Achilles (S1): normal (2+)  Babinski sign: negative    Right   Patellar (L4): normal (2+)  Achilles (S1): normal (2+)  Babinski sign: negative  Mechanical Assessment    Cervical      Thoracic      Lumbar    Standing extension: repeated movements  Pain location: centralized  Pain intensity: better  Pain level: abolished    Strength/Myotome Testing     Left Hip   Planes of Motion   Flexion: 4- (concordant pain)  Abduction: 4  Adduction: 4-    Right Hip   Planes of Motion   Flexion: 4  Abduction: 4  Adduction: 4-    Left Knee   Flexion: 4  Extension: 4+    Right Knee   Flexion: 4  Extension: 4+    Left Ankle/Foot   Dorsiflexion: 5  Plantar flexion: 5  Great toe extension: 5    Right Ankle/Foot   Dorsiflexion: 5  Plantar flexion: 5  Great toe extension: 5    Muscle Activation      Additional Muscle Activation Details  IMPROVED TrA, multifidus, gluteal activation with transfers / position changes / dynamic movement      Tests     Lumbar     Left   Negative slump test.     Right   Negative slump test.     Additional Tests Details  FOTO: 72% ( predicted 48%)               Precautions: Hx DVT R LE  RE-EVAL:5/19    Access Code [email or web address]   Specialty Daily Treatment Diary     Manual  4/18 4/22 4/4 4/8 4/11   Lumbar P/A                Manual nerve glides        Piriformis Stretch B/L        Prone Quad stretch            Therapeutic Exercise 4/18 4/22 4/4 4/8 4/11   Treadmill Ambulation for endurance        UBE Stand ALT FWD/BACK for core/posture strength L3 x8 min ALT L3 x 8 mins L3 x8 min AlT L3 x8 min ALT L3 x8 min ALT   Snow angle X1 min lay   :15x3    X1 min lay   :15x3   Open book X10 B/L    X10 B/L           Bridges x x15 :03 x10 :03x15 :03 x15   Self 90-90 hamstring stretch B/L x 4x:15 B/L :15x4 ea B/L :15x4 ea B/L :15x4 ea B/L   Prone press ups        Standing back extensions 1x10 after UBE x10 1x10 after UBE 1x10 after UBE 1x10 after UBE   Hip sags        T-spine EXT X15 1/2 roll X15 1/2 roll X10 1/2 roll X15 1/2 roll X15 1/2 roll           Stand Hip ABD+EXT X15 B/L X15 B/L X15 B/L X15 B/L X15 B/L           Neuro Re-ed        Core brace x15 W/ UBE x10 Supine :05x10 :05x10 :05 x10   kegels x15 W/UBEx10 :05x15 :05x15 :05x10   Multifidi Stand ant tilt x15  difficuly X15 ant tilt Stand ant tilt x15  difficuly Stand ant tilt x15  difficuly Stand ant tilt x15  difficuly   Glute sets x15 x10 x15 x15 x15   Quad DLS UE  LE  UE+LE        Quad knee lifts        Clam shells X15 ea  :03x10 ea :03x15 ea :03 x 15 ea   MTP/LTP/ Anti-rotations MTP/LTP x15 ea RED reviewed  MTP/LTP x10 ea RED MTP/LTP x10 ea RED   Towel roll education, Posture correction; movement precautions  reviewed              SLB        Posture corrections seated :10 x10  :10x10 :10x10 :10x10           Sidestepping 10ft x4 10ft x 4  "10ft x4 10ft x4 10 ft x4   Heel toe Amb 10 ft x4 10ft x 4 10ft x4 10ft x4 10 ft x4           CSMi \"+\" WS x15 ea  SQ x10 WS x15 ea  SQx10 X15 ea WS \"+\" WS x15 ea  SQ x10 WS x15 ea  SQ x10   Therapeutic Activity        Steps ups fwd/side NV       Crate carry        Crate lifts 3 levels        Lunges        Chair squats            Modalities        MHP/CP to L/B                                     "

## 2025-04-22 ENCOUNTER — EVALUATION (OUTPATIENT)
Dept: PHYSICAL THERAPY | Facility: CLINIC | Age: 75
End: 2025-04-22
Payer: MEDICARE

## 2025-04-22 DIAGNOSIS — G89.29 CHRONIC LOW BACK PAIN WITH BILATERAL SCIATICA, UNSPECIFIED BACK PAIN LATERALITY: Primary | ICD-10-CM

## 2025-04-22 DIAGNOSIS — M54.42 CHRONIC LOW BACK PAIN WITH BILATERAL SCIATICA, UNSPECIFIED BACK PAIN LATERALITY: Primary | ICD-10-CM

## 2025-04-22 DIAGNOSIS — M54.41 CHRONIC LOW BACK PAIN WITH BILATERAL SCIATICA, UNSPECIFIED BACK PAIN LATERALITY: Primary | ICD-10-CM

## 2025-04-22 PROCEDURE — 97112 NEUROMUSCULAR REEDUCATION: CPT | Performed by: PHYSICAL THERAPIST

## 2025-04-22 PROCEDURE — 97110 THERAPEUTIC EXERCISES: CPT | Performed by: PHYSICAL THERAPIST

## 2025-04-23 ENCOUNTER — APPOINTMENT (OUTPATIENT)
Dept: LAB | Facility: CLINIC | Age: 75
End: 2025-04-23
Attending: INTERNAL MEDICINE
Payer: MEDICARE

## 2025-04-23 ENCOUNTER — OFFICE VISIT (OUTPATIENT)
Dept: HEMATOLOGY ONCOLOGY | Facility: CLINIC | Age: 75
End: 2025-04-23
Payer: MEDICARE

## 2025-04-23 VITALS
BODY MASS INDEX: 34.22 KG/M2 | TEMPERATURE: 97.7 F | SYSTOLIC BLOOD PRESSURE: 130 MMHG | DIASTOLIC BLOOD PRESSURE: 80 MMHG | OXYGEN SATURATION: 97 % | HEART RATE: 74 BPM | HEIGHT: 58 IN | WEIGHT: 163 LBS | RESPIRATION RATE: 18 BRPM

## 2025-04-23 DIAGNOSIS — R79.89 ELEVATED FERRITIN: ICD-10-CM

## 2025-04-23 DIAGNOSIS — E53.8 B12 DEFICIENCY: ICD-10-CM

## 2025-04-23 DIAGNOSIS — E53.8 VITAMIN B12 DEFICIENCY: ICD-10-CM

## 2025-04-23 DIAGNOSIS — R71.8 ELEVATED HEMATOCRIT: Primary | ICD-10-CM

## 2025-04-23 DIAGNOSIS — R71.8 ELEVATED HEMATOCRIT: ICD-10-CM

## 2025-04-23 LAB
ALBUMIN SERPL BCG-MCNC: 4.1 G/DL (ref 3.5–5)
ALP SERPL-CCNC: 93 U/L (ref 34–104)
ALT SERPL W P-5'-P-CCNC: 22 U/L (ref 7–52)
ANION GAP SERPL CALCULATED.3IONS-SCNC: 8 MMOL/L (ref 4–13)
AST SERPL W P-5'-P-CCNC: 22 U/L (ref 13–39)
BASOPHILS # BLD AUTO: 0.01 THOUSANDS/ÂΜL (ref 0–0.1)
BASOPHILS NFR BLD AUTO: 0 % (ref 0–1)
BILIRUB SERPL-MCNC: 1.01 MG/DL (ref 0.2–1)
BUN SERPL-MCNC: 12 MG/DL (ref 5–25)
CALCIUM SERPL-MCNC: 9.6 MG/DL (ref 8.4–10.2)
CHLORIDE SERPL-SCNC: 107 MMOL/L (ref 96–108)
CO2 SERPL-SCNC: 28 MMOL/L (ref 21–32)
CREAT SERPL-MCNC: 0.74 MG/DL (ref 0.6–1.3)
CRP SERPL QL: 1.6 MG/L
EOSINOPHIL # BLD AUTO: 0.15 THOUSAND/ÂΜL (ref 0–0.61)
EOSINOPHIL NFR BLD AUTO: 3 % (ref 0–6)
ERYTHROCYTE [DISTWIDTH] IN BLOOD BY AUTOMATED COUNT: 13 % (ref 11.6–15.1)
ERYTHROCYTE [SEDIMENTATION RATE] IN BLOOD: 28 MM/HOUR (ref 0–29)
FERRITIN SERPL-MCNC: 393 NG/ML (ref 30–307)
GFR SERPL CREATININE-BSD FRML MDRD: 81 ML/MIN/1.73SQ M
GLUCOSE SERPL-MCNC: 93 MG/DL (ref 65–140)
HCT VFR BLD AUTO: 44.8 % (ref 34.8–46.1)
HGB BLD-MCNC: 14.8 G/DL (ref 11.5–15.4)
IMM GRANULOCYTES # BLD AUTO: 0.01 THOUSAND/UL (ref 0–0.2)
IMM GRANULOCYTES NFR BLD AUTO: 0 % (ref 0–2)
IRON SATN MFR SERPL: 36 % (ref 15–50)
IRON SERPL-MCNC: 116 UG/DL (ref 50–212)
LDH SERPL-CCNC: 162 U/L (ref 140–271)
LYMPHOCYTES # BLD AUTO: 0.83 THOUSANDS/ÂΜL (ref 0.6–4.47)
LYMPHOCYTES NFR BLD AUTO: 18 % (ref 14–44)
MAGNESIUM SERPL-MCNC: 2.3 MG/DL (ref 1.9–2.7)
MCH RBC QN AUTO: 30.9 PG (ref 26.8–34.3)
MCHC RBC AUTO-ENTMCNC: 33 G/DL (ref 31.4–37.4)
MCV RBC AUTO: 94 FL (ref 82–98)
MONOCYTES # BLD AUTO: 0.46 THOUSAND/ÂΜL (ref 0.17–1.22)
MONOCYTES NFR BLD AUTO: 10 % (ref 4–12)
NEUTROPHILS # BLD AUTO: 3.14 THOUSANDS/ÂΜL (ref 1.85–7.62)
NEUTS SEG NFR BLD AUTO: 69 % (ref 43–75)
NRBC BLD AUTO-RTO: 0 /100 WBCS
PLATELET # BLD AUTO: 204 THOUSANDS/UL (ref 149–390)
PMV BLD AUTO: 9.8 FL (ref 8.9–12.7)
POTASSIUM SERPL-SCNC: 4.1 MMOL/L (ref 3.5–5.3)
PROT SERPL-MCNC: 6.6 G/DL (ref 6.4–8.4)
RBC # BLD AUTO: 4.79 MILLION/UL (ref 3.81–5.12)
SODIUM SERPL-SCNC: 143 MMOL/L (ref 135–147)
TIBC SERPL-MCNC: 322 UG/DL (ref 250–450)
TRANSFERRIN SERPL-MCNC: 230 MG/DL (ref 203–362)
UIBC SERPL-MCNC: 206 UG/DL (ref 155–355)
VIT B12 SERPL-MCNC: 224 PG/ML (ref 180–914)
WBC # BLD AUTO: 4.6 THOUSAND/UL (ref 4.31–10.16)

## 2025-04-23 PROCEDURE — 83550 IRON BINDING TEST: CPT

## 2025-04-23 PROCEDURE — 82607 VITAMIN B-12: CPT

## 2025-04-23 PROCEDURE — 83540 ASSAY OF IRON: CPT

## 2025-04-23 PROCEDURE — 86140 C-REACTIVE PROTEIN: CPT

## 2025-04-23 PROCEDURE — G2211 COMPLEX E/M VISIT ADD ON: HCPCS | Performed by: INTERNAL MEDICINE

## 2025-04-23 PROCEDURE — 85652 RBC SED RATE AUTOMATED: CPT

## 2025-04-23 PROCEDURE — 80053 COMPREHEN METABOLIC PANEL: CPT

## 2025-04-23 PROCEDURE — 36415 COLL VENOUS BLD VENIPUNCTURE: CPT

## 2025-04-23 PROCEDURE — 83615 LACTATE (LD) (LDH) ENZYME: CPT

## 2025-04-23 PROCEDURE — 82728 ASSAY OF FERRITIN: CPT

## 2025-04-23 PROCEDURE — 85025 COMPLETE CBC W/AUTO DIFF WBC: CPT

## 2025-04-23 PROCEDURE — 99214 OFFICE O/P EST MOD 30 MIN: CPT | Performed by: INTERNAL MEDICINE

## 2025-04-23 PROCEDURE — 83735 ASSAY OF MAGNESIUM: CPT

## 2025-04-23 NOTE — PROGRESS NOTES
Name: Jenny Gipson      : 1950      MRN: [id number]  Encounter Provider: El Brush MD  Encounter Date: 2025   Encounter department: [facility] [place]  :  Assessment & Plan  Elevated hematocrit  The patient according to the most recent available blood work from 2024 seems to have high normal hemoglobin/hematocrit levels.  She did have JAK2 mutation analysis in the past which was negative ruling out polycythemia vera or myeloproliferative disorder.  She was asked to get her blood work done hopefully very soon.  If it continues to be in the normal range, we would be more than happy to see her on as needed basis.       B12 deficiency  Level was ordered.       Elevated ferritin  Iron panel was ordered.  Her prior iron panel showed iron saturation less than 45% which goes against iron overload.  She was asked to get her iron panel done today.  She will be notified about the result.  She should get her iron panel checked at least once a year.           Return if symptoms worsen or fail to improve.    History of Present Illness   Chief Complaint   Patient presents with    Follow-up   HPI:  Patient is a 73-year-old female who originally presents for further evaluation of her elevated hematocrit/RBCs.  She has no significant past medical history other than abnormal Pap smear status post hysterectomy; is being monitored by gynecology oncology.     She has never smoked and denies any secondhand smoke exposure.  Denied any constitutional symptoms.  Denied early satiety, rash, headaches, dizziness or aquagenic pruritus.  Denies any history of lung disease.  She does admits that she was told in the past that she snores on occasion but denies fatigue/daytime sleepiness or any episodes of awakening gasping for air/choking.  Patient states that she is very active and does not like to sit still.  She still works 1 day a week part-time and otherwise is staying active running  errands or doing things around the house.  She does consume a significant amount of coffee on a daily basis and admits she does not drink a lot of water during the day 1-2, 8 oz bottles if that.     Her laboratory studies from 08/09/2022 showed normal WBC 5.64, RBC slightly above average 5.17, hemoglobin high normal 15.1, hematocrit mildly elevated 48.1%, MCV normal 93 platelet count normal 230. Total bili slightly above average 1.03 otherwise normal CMP.  She does not have a lot of prior laboratory studies available for review, but did note mild elevation of her H&H/RBC on her CBC from September 2014.     Her most recent laboratory studies from 11/09/2022 showed normal white cells and platelets, RBC is normal 4.91, hemoglobin normal 15 her hematocrit continues to be higher than average 48.3%, MCV 98.  Glucose 105, total bili 1.26 remaining metabolic panel is normal.  Her inflammatory markers are not elevated.  Erythropoietin 6.8.  Carboxyhemoglobin level normal 1.4%.  LDH is normal 143.  Her B12 is lower than average 307.  Folic acid is appropriate.  Iron panel showed normal iron saturation 30%, TIBC 330, serum iron 125 with ferritin elevated 450.  Reflexive JAK2 mutational studies were ordered unfortunately the lab only checked JAK2 exons 12-15 which came back negative.     Additional work-up 11/09/2022 showed normal white cells and platelets, RBC is normal 4.91, hemoglobin normal 15 her hematocrit continues to be higher than average 48.3%, MCV 98.  Glucose 105, total bili 1.26 remaining metabolic panel is normal.  Her inflammatory markers are not elevated.  Erythropoietin 6.8. Carboxyhemoglobin level normal 1.4%.  LDH is normal 143.  Her B12 is lower than average 307.  Folic acid is appropriate.  Iron panel showed normal iron saturation 30%, TIBC 330, serum iron 125 with ferritin elevated 450.  Reflexive JAK2 mutational studies were ordered unfortunately the lab only checked JAK2 exons 12-15 which came back  "negative.  She then had JAK2 V617F test 4/2023 which came back negative.     Interval history:  The patient came today for a follow-up visit accompanied by her .  She denied any significant change of her overall condition.  She did not do blood work prior to this office visit.  Her most recent available blood work from 7/11/2024 showed normal hemoglobin of 15.0 with normal white cells and platelets.  Hematocrit was slightly above normal 46.2%.             Review of Systems   Constitutional:  Negative for chills and fever.   HENT:  Negative for ear pain and sore throat.    Eyes:  Negative for pain and visual disturbance.   Respiratory:  Negative for cough and shortness of breath.    Cardiovascular:  Negative for chest pain and palpitations.   Gastrointestinal:  Negative for abdominal pain and vomiting.   Genitourinary:  Negative for dysuria and hematuria.   Musculoskeletal:  Negative for arthralgias and back pain.   Skin:  Negative for color change and rash.   Neurological:  Positive for numbness. Negative for seizures and syncope.   All other systems reviewed and are negative.    Medical History Reviewed by provider this encounter:     .  Current Outpatient Medications on File Prior to Visit   Medication Sig Dispense Refill    Cholecalciferol (VITAMIN D3) 1,000 units tablet Take 1,000 Units by mouth daily       No current facility-administered medications on file prior to visit.      Social History     Tobacco Use    Smoking status: Never    Smokeless tobacco: Never   Vaping Use    Vaping status: Never Used   Substance and Sexual Activity    Alcohol use: Yes     Alcohol/week: 1.0 standard drink of alcohol     Types: 1 Standard drinks or equivalent per week    Drug use: Never    Sexual activity: Not Currently     Partners: Male         Objective   /80 (BP Location: Right arm, Patient Position: Sitting, Cuff Size: Adult)   Pulse 74   Temp 97.7 °F (36.5 °C)   Resp 18   Ht 4' 10\" (1.473 m)   Wt 73.9 kg " (163 lb)   SpO2 97%   BMI 34.07 kg/m²     Physical Exam  Constitutional:       General: She is not in acute distress.     Appearance: She is well-developed. She is not diaphoretic.   HENT:      Head: Normocephalic and atraumatic.      Nose: Nose normal.   Eyes:      General: No scleral icterus.        Right eye: No discharge.         Left eye: No discharge.      Conjunctiva/sclera: Conjunctivae normal.      Pupils: Pupils are equal, round, and reactive to light.   Neck:      Thyroid: No thyromegaly.      Vascular: No JVD.      Trachea: No tracheal deviation.   Cardiovascular:      Rate and Rhythm: Normal rate and regular rhythm.      Heart sounds: Normal heart sounds. No murmur heard.     No friction rub.   Pulmonary:      Effort: Pulmonary effort is normal. No respiratory distress.      Breath sounds: Normal breath sounds. No stridor. No wheezing or rales.   Chest:      Chest wall: No tenderness.   Abdominal:      General: There is no distension.      Palpations: Abdomen is soft. There is no hepatomegaly or splenomegaly.      Tenderness: There is no abdominal tenderness. There is no guarding or rebound.   Musculoskeletal:         General: No tenderness or deformity. Normal range of motion.      Cervical back: Normal range of motion and neck supple.   Lymphadenopathy:      Cervical: No cervical adenopathy.   Skin:     General: Skin is warm and dry.      Coloration: Skin is not pale.      Findings: No erythema or rash.   Neurological:      Mental Status: She is alert and oriented to person, place, and time.      Cranial Nerves: No cranial nerve deficit.      Coordination: Coordination normal.      Deep Tendon Reflexes: Reflexes are normal and symmetric.   Psychiatric:         Behavior: Behavior normal.         Thought Content: Thought content normal.         Judgment: Judgment normal.         Labs: I have reviewed the following labs:  Results for orders placed or performed in visit on 02/20/25   Liquid-based pap,  "diagnostic   Result Value Ref Range    Case Report       Gynecologic Cytology Report                       Case: [id number]                                  Authorizing Provider:  Rogelio Pedro MD       Collected:           02/20/2025 0815              Ordering Location:     [facility] Gyn Received:            02/20/2025 0815                                     Oncology [place]                                                           First Screen:          Senait Ríos                                                            Pathologist:           Oleg Lopez MD                                                    Specimen:    LIQUID-BASED PAP, DIAGNOSTIC, Vaginal                                                      Primary Interpretation Epithelial cell abnormality     Interpretation Atypical squamous cells, cannot exclude HSIL     Specimen Adequacy Satisfactory for evaluation.     Additional Information       [employer]s FDA approved ,  and ThinPrep Imaging Duo System are utilized with strict adherence to the 's instruction manual to prepare gynecologic and non-gynecologic cytology specimens for the production of ThinPrep slides as well as for gynecologic ThinPrep imaging. These processes have been validated by our laboratory and/or by the .  The Pap test is not a diagnostic procedure and should not be used as the sole means to detect cervical cancer. It is only a screening procedure to aid in the detection of cervical cancer and its precursors. Both false-negative and false-positive results have been experienced. Your patient's test result should be interpreted in this context together with the history and clinical findings.  Interpretation performed at [facility], [address] [place] [id number]      Gross Description       20 ml , colorless, cloudy received in a ThinPrep vial.     No results found for: \"WBC\", \"RBC\", \"HGB\", \"HCT\", \"MCV\", " "\"MCH\", \"RDW\", \"PLT\", \"NEUTOPHILPCT\", \"BANDSPCT\", \"LYMPHOPCT\", \"MONOPCT\", \"EOSPCT\", \"BASOPCT\", \"IMMGRANS\", \"NEUTROABS\"   No results found for: \"SODIUM\", \"K\", \"CL\", \"CO2\", \"AGAP\", \"BUN\", \"CREATININE\", \"GLUC\", \"GLUF\", \"CALCIUM\", \"CORRECTEDCA\", \"AST\", \"ALT\", \"ALKPHOS\", \"TP\", \"ALB\", \"TBILI\", \"EGFR\"   No results found for: \"IRON\", \"CONCFE\", \"FERRITIN\", \"LXRMIMJH12\", \"FOLATE\", \"COPPER\", \"EPOREFLAB\", \"ERYTHROPRO\", \"ESR\", \"CRP\", \"HIVAGAB\", \"HEPATITIS\"   Results for orders placed or performed in visit on 02/20/25   Liquid-based pap, diagnostic   Result Value Ref Range    Case Report       Gynecologic Cytology Report                       Case: [id number]                                  Authorizing Provider:  Rogelio Pedro MD       Collected:           02/20/2025 0815              Ordering Location:     [facility] Gyn Received:            02/20/2025 [facility]                                     Oncology [place]                                                           First Screen:          Senait Ríos                                                            Pathologist:           Oleg Lopez MD                                                    Specimen:    LIQUID-BASED PAP, DIAGNOSTIC, Vaginal                                                      Primary Interpretation Epithelial cell abnormality     Interpretation Atypical squamous cells, cannot exclude HSIL     Specimen Adequacy Satisfactory for evaluation.     Additional Information       [employer]'s FDA approved ,  and ThinPrep Imaging Duo System are utilized with strict adherence to the 's instruction manual to prepare gynecologic and non-gynecologic cytology specimens for the production of ThinPrep slides as well as for gynecologic ThinPrep imaging. These processes have been validated by our laboratory and/or by the .  The Pap test is not a diagnostic procedure and should not be used as the sole means to " detect cervical cancer. It is only a screening procedure to aid in the detection of cervical cancer and its precursors. Both false-negative and false-positive results have been experienced. Your patient's test result should be interpreted in this context together with the history and clinical findings.  Interpretation performed at [facility], [address] [place] [id number]      Gross Description       20 ml , colorless, cloudy received in a ThinPrep vial.

## 2025-05-29 ENCOUNTER — TELEPHONE (OUTPATIENT)
Age: 75
End: 2025-05-29

## 2025-05-29 ENCOUNTER — TELEPHONE (OUTPATIENT)
Dept: FAMILY MEDICINE CLINIC | Facility: CLINIC | Age: 75
End: 2025-05-29

## 2025-05-29 NOTE — TELEPHONE ENCOUNTER
Tried calling pt ..  is transferring to [place] office call to see if we can move her appt to [place]

## 2025-05-29 NOTE — TELEPHONE ENCOUNTER
Pt called back from a missed call from the office.  I saw the note that she needs to reschedule her AWV with Mitchell but it was not allowing me to schedule for him in September.  Can the office please call the patient back and get her rescheduled.  Thank you.

## 2025-06-04 ENCOUNTER — TELEPHONE (OUTPATIENT)
Dept: OBGYN CLINIC | Facility: CLINIC | Age: 75
End: 2025-06-04

## 2025-06-10 ENCOUNTER — TELEPHONE (OUTPATIENT)
Dept: OBGYN CLINIC | Facility: CLINIC | Age: 75
End: 2025-06-10

## 2025-06-10 NOTE — TELEPHONE ENCOUNTER
Lmovm asking pt to call back and reschedule 6/20 appt due to a change in Dr Hinson's schedule he will be out of the office that day

## 2025-07-02 ENCOUNTER — TELEPHONE (OUTPATIENT)
Dept: OBGYN CLINIC | Facility: CLINIC | Age: 75
End: 2025-07-02

## 2025-07-02 NOTE — TELEPHONE ENCOUNTER
Pt does not want to reschedule missed appt on 6/23 with Dr Hinson. She stated that she is feeling good and does not want any shots

## 2025-07-10 ENCOUNTER — TELEPHONE (OUTPATIENT)
Dept: GYNECOLOGIC ONCOLOGY | Facility: CLINIC | Age: 75
End: 2025-07-10

## 2025-07-10 NOTE — TELEPHONE ENCOUNTER
Attempted to speak to the patient, but she hung up while I was speaking. The call was in regards to rescheduling her 09/1/2025 appointment with .

## (undated) DEVICE — PVC URETHRAL CATHETER: Brand: DOVER

## (undated) DEVICE — STERILE 8 INCH PROCTO SWAB: Brand: CARDINAL HEALTH

## (undated) DEVICE — BETHLEHEM UNIVERSAL MINOR VAG: Brand: CARDINAL HEALTH

## (undated) DEVICE — FIBER IMPERIUM 500

## (undated) DEVICE — STRL COTTON TIP APPLCTR 6IN PK: Brand: CARDINAL HEALTH

## (undated) DEVICE — CHLORHEXIDINE 4PCT 4 OZ

## (undated) DEVICE — GLOVE INDICATOR PI UNDERGLOVE SZ 7.5 BLUE

## (undated) DEVICE — PREMIUM DRY TRAY LF: Brand: MEDLINE INDUSTRIES, INC.

## (undated) DEVICE — SPECIMEN CONTAINER STERILE PEEL PACK

## (undated) DEVICE — GLOVE PI ULTRA TOUCH SZ.7.5

## (undated) DEVICE — SMOKE EVACUATION TUBING WITH 7/8 IN TO 1/4 IN REDUCER: Brand: BUFFALO FILTER